# Patient Record
Sex: MALE | Race: WHITE | NOT HISPANIC OR LATINO | Employment: FULL TIME | URBAN - METROPOLITAN AREA
[De-identification: names, ages, dates, MRNs, and addresses within clinical notes are randomized per-mention and may not be internally consistent; named-entity substitution may affect disease eponyms.]

---

## 2018-08-17 ENCOUNTER — OFFICE VISIT (OUTPATIENT)
Dept: FAMILY MEDICINE | Facility: CLINIC | Age: 28
End: 2018-08-17
Payer: MEDICAID

## 2018-08-17 VITALS
SYSTOLIC BLOOD PRESSURE: 113 MMHG | BODY MASS INDEX: 23.3 KG/M2 | HEART RATE: 67 BPM | HEIGHT: 72 IN | WEIGHT: 172 LBS | OXYGEN SATURATION: 98 % | TEMPERATURE: 98 F | RESPIRATION RATE: 16 BRPM | DIASTOLIC BLOOD PRESSURE: 64 MMHG

## 2018-08-17 DIAGNOSIS — R06.83 SNORING: ICD-10-CM

## 2018-08-17 DIAGNOSIS — F33.1 MODERATE EPISODE OF RECURRENT MAJOR DEPRESSIVE DISORDER (H): Primary | ICD-10-CM

## 2018-08-17 DIAGNOSIS — F41.1 GAD (GENERALIZED ANXIETY DISORDER): ICD-10-CM

## 2018-08-17 PROCEDURE — 99203 OFFICE O/P NEW LOW 30 MIN: CPT | Performed by: FAMILY MEDICINE

## 2018-08-17 RX ORDER — CITALOPRAM HYDROBROMIDE 20 MG/1
20 TABLET ORAL DAILY
Qty: 90 TABLET | Refills: 3 | Status: SHIPPED | OUTPATIENT
Start: 2018-08-17 | End: 2019-07-12

## 2018-08-17 RX ORDER — BUPROPION HYDROCHLORIDE 300 MG/1
300 TABLET ORAL
COMMUNITY
Start: 2018-08-07 | End: 2018-08-17

## 2018-08-17 RX ORDER — BUPROPION HYDROCHLORIDE 300 MG/1
300 TABLET ORAL EVERY MORNING
Qty: 90 TABLET | Refills: 3 | Status: SHIPPED | OUTPATIENT
Start: 2018-08-17 | End: 2019-07-12

## 2018-08-17 RX ORDER — CITALOPRAM HYDROBROMIDE 20 MG/1
TABLET ORAL
COMMUNITY
Start: 2018-08-07 | End: 2018-08-17

## 2018-08-17 ASSESSMENT — ANXIETY QUESTIONNAIRES
5. BEING SO RESTLESS THAT IT IS HARD TO SIT STILL: NOT AT ALL
6. BECOMING EASILY ANNOYED OR IRRITABLE: NOT AT ALL
3. WORRYING TOO MUCH ABOUT DIFFERENT THINGS: NOT AT ALL
IF YOU CHECKED OFF ANY PROBLEMS ON THIS QUESTIONNAIRE, HOW DIFFICULT HAVE THESE PROBLEMS MADE IT FOR YOU TO DO YOUR WORK, TAKE CARE OF THINGS AT HOME, OR GET ALONG WITH OTHER PEOPLE: NOT DIFFICULT AT ALL
7. FEELING AFRAID AS IF SOMETHING AWFUL MIGHT HAPPEN: NOT AT ALL
GAD7 TOTAL SCORE: 1
2. NOT BEING ABLE TO STOP OR CONTROL WORRYING: NOT AT ALL
1. FEELING NERVOUS, ANXIOUS, OR ON EDGE: SEVERAL DAYS

## 2018-08-17 ASSESSMENT — PATIENT HEALTH QUESTIONNAIRE - PHQ9: 5. POOR APPETITE OR OVEREATING: NOT AT ALL

## 2018-08-17 NOTE — MR AVS SNAPSHOT
After Visit Summary   8/17/2018    José Waldron    MRN: [id number]           Patient Information     Date Of Birth          1990        Visit Information        Provider Department      8/17/2018 2:20 PM Levy Madrid MD [facility]        Today's Diagnoses     Moderate episode of recurrent major depressive disorder (H)    -  1    BEN (generalized anxiety disorder)        Snoring           Follow-ups after your visit        Additional Services     SLEEP EVALUATION & MANAGEMENT REFERRAL - [facility]  [phone] (Age 2 and up)       Please be aware that coverage of these services is subject to the terms and limitations of your health insurance plan.  Call member services at your health plan with any benefit or coverage questions.      Please bring the following to your appointment:    >>   List of current medications   >>   This referral request   >>   Any documents/labs given to you for this referral                      Future tests that were ordered for you today     Open Future Orders        Priority Expected Expires Ordered    SLEEP EVALUATION & MANAGEMENT REFERRAL - [facility]  [phone] (Age 2 and up) Routine  8/17/2019 8/17/2018            Who to contact     If you have questions or need follow up information about today's clinic visit or your schedule please contact [facility] directly at [phone].  Normal or non-critical lab and imaging results will be communicated to you by MyChart, letter or phone within 4 business days after the clinic has received the results. If you do not hear from us within 7 days, please contact the clinic through MyChart or phone. If you have a critical or abnormal lab result, we will notify you by phone as soon as possible.  Submit refill requests through [employer] or call your pharmacy and they  will forward the refill request to us. Please allow 3 business days for your refill to be completed.          Additional Information About Your Visit        Care EveryWhere ID     This is your Care EveryWhere ID. This could be used by other organizations to access your [facility] medical records  [id number]        Your Vitals Were     Pulse Temperature Respirations Height Pulse Oximetry BMI (Body Mass Index)    67 98  F (36.7  C) (Oral) 16 6' (1.829 m) 98% 23.33 kg/m2       Blood Pressure from Last 3 Encounters:   08/17/18 113/64    Weight from Last 3 Encounters:   08/17/18 172 lb (78 kg)                 Today's Medication Changes          These changes are accurate as of 8/17/18  2:56 PM.  If you have any questions, ask your nurse or doctor.               These medicines have changed or have updated prescriptions.        Dose/Directions    buPROPion 300 MG 24 hr tablet   Commonly known as:  WELLBUTRIN XL   This may have changed:  when to take this   Used for:  Moderate episode of recurrent major depressive disorder (H), BEN (generalized anxiety disorder)   Changed by:  Levy Madrid MD        Dose:  300 mg   Take 1 tablet (300 mg) by mouth every morning   Quantity:  90 tablet   Refills:  3       citalopram 20 MG tablet   Commonly known as:  celeXA   This may have changed:  See the new instructions.   Used for:  Moderate episode of recurrent major depressive disorder (H), BEN (generalized anxiety disorder)   Changed by:  Levy Madrid MD        Dose:  20 mg   Take 1 tablet (20 mg) by mouth daily   Quantity:  90 tablet   Refills:  3            Where to get your medicines      These medications were sent to [id number] - [place], [place] - [address] AT [facility] [address]  [address], [place] [address]    Hours:  24-hours Phone:  [phone]     buPROPion 300 MG 24 hr tablet    citalopram 20 MG tablet                Primary Care Provider Office Phone #  Fax #    Levy Juanita Madrid [phone] [address] [place] [id number]        Equal Access to Services     CAROLINA SINGLETARY : Hadii aad ku hadshannongreg Auguste, wamonida rogerqadaha, qakirata kaalmada bethivy, sharlene porshain hayaabecca camposmarkos arcadiovamshisony son. So [facility] [phone].    ATENCIÓN: Si habla español, tiene a purcell disposición servicios gratuitos de asistencia lingüística. Llame al [phone].    We comply with applicable federal civil rights laws and [place] laws. We do not discriminate on the basis of race, color, national origin, age, disability, sex, sexual orientation, or gender identity.            Thank you!     Thank you for choosing [facility]  for your care. Our goal is always to provide you with excellent care. Hearing back from our patients is one way we can continue to improve our services. Please take a few minutes to complete the written survey that you may receive in the mail after your visit with us. Thank you!             Your Updated Medication List - Protect others around you: Learn how to safely use, store and throw away your medicines at www.disposemymeds.org.          This list is accurate as of 8/17/18  2:56 PM.  Always use your most recent med list.                   Brand Name Dispense Instructions for use Diagnosis    buPROPion 300 MG 24 hr tablet    WELLBUTRIN XL    90 tablet    Take 1 tablet (300 mg) by mouth every morning    Moderate episode of recurrent major depressive disorder (H), BEN (generalized anxiety disorder)       citalopram 20 MG tablet    celeXA    90 tablet    Take 1 tablet (20 mg) by mouth daily    Moderate episode of recurrent major depressive disorder (H), BEN (generalized anxiety disorder)

## 2018-08-17 NOTE — PROGRESS NOTES
SUBJECTIVE:   José Waldron is a 27 year old male who presents to clinic today for the following health issues:      History of Present Illness     Depression & Anxiety Follow-up:     Depression/Anxiety:  Depression & Anxiety    Status since last visit::  Stable    Other associated symptoms of depression and anxiety::  YES    Significant life event::  No    Current substance use::  Alcohol and Prescription Drugs       Today's PHQ-9         PHQ-9 Total Score:         PHQ-9 Q9 Suicidal ideation:       Thoughts of suicide or self harm:      Self-harm Plan:        Self-harm Action:          Safety concerns for self or others:            Diet:  Regular (no restrictions)  Frequency of exercise:  2-3 days/week  Duration of exercise:  30-45 minutes  Taking medications regularly:  Yes  Medication side effects:  None  Additional concerns today:  [place]    - [employer] - physician retired.  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 Does well with rx.     When 21 diagnosed with anxiety.   When 23 - depression.     Father - depression.     No suicidal thoughts, attempts. No FH of suicide attempts.     No hospitalization.     Around 5 yrs of antidepressant and using celexa and wellbutrin combo.    No known family history of thyroid disease.     Interested in sleep study. [place] tooth removed - under sedation his breathing was not good.   Room partners -mentioned snoring.     Working part time.  - [employer].     Exercise - 3 times per week or so.     Lives with 2 roommates.    Smoking - no smoking.    Alcohol - 4-5 drinks per week.     No street drug use.     No manic episodes.     Problem list and histories reviewed & adjusted, as indicated.  Additional history: as documented        Social History     Social History     Marital status: Single     Spouse name: N/A     Number of children: N/A     Years of education: N/A     Occupational History     Not on file.     Social History Main Topics     Smoking status: Never Smoker      Smokeless tobacco: Never Used     Alcohol use Yes      Comment: 4 drinks per wlks     Drug use: Yes     Sexual activity: Yes     Partners: Male     Birth control/ protection: Condom     Other Topics Concern     Not on file     Social History Narrative     No narrative on file     No Known Allergies  There is no problem list on file for this patient.    Reviewed medications, social history and  past medical and surgical history.    Review of system: for general, respiratory, CVS, GI and psychiatry negative except for noted above.     EXAM:  /64  Pulse 67  Temp 98  F (36.7  C) (Oral)  Resp 16  Ht 6' (1.829 m)  Wt 172 lb (78 kg)  SpO2 98%  BMI 23.33 kg/m2  Constitutional: healthy, alert and no distress   Psychiatric: mentation appears normal and affect normal/bright  Cardiovascular: RRR. No murmurs,  Respiratory: negative, Lungs clear. No crackles or wheezing. No tachypnea.        ASSESSMENT / PLAN:  (F33.1) Moderate episode of recurrent major depressive disorder (H)  (primary encounter diagnosis)  Comment: He has a long history of depression and anxiety.  He has been on a stable dose of Wellbutrin and Celexa.  Continue the same.  Reviewed his records via care everywhere.  I will take over his medications.  He has never had  any ev depression and anxiety including vitamin D deficiency, low hemoglobin, TSH.  I offered him those labes.  He would like to hold off on those for now.  Plan: buPROPion (WELLBUTRIN XL) 300 MG 24 hr tablet,         citalopram (CELEXA) 20 MG tablet             (F41.1) BEN (generalized anxiety disorder)  Comment:     Plan: buPROPion (WELLBUTRIN XL) 300 MG 24 hr tablet,         citalopram (CELEXA) 20 MG tablet             (R06.83) Snoring  Comment:  Self-reported.  No previous history of sleep apnea.  Reasonable to consider sleep medication referral.   Plan: SLEEP EVALUATION & MANAGEMENT REFERRAL - [facility]   [phone] (Age 2 and up)             Follow up: In a year if doing well.       Answers for HPI/ROS submitted by the patient on 8/17/2018   PHQ-2 Score: 0

## 2018-08-18 ASSESSMENT — ANXIETY QUESTIONNAIRES: GAD7 TOTAL SCORE: 1

## 2018-08-18 ASSESSMENT — PATIENT HEALTH QUESTIONNAIRE - PHQ9: SUM OF ALL RESPONSES TO PHQ QUESTIONS 1-9: 2

## 2018-08-30 ENCOUNTER — OFFICE VISIT (OUTPATIENT)
Dept: SLEEP MEDICINE | Facility: CLINIC | Age: 28
End: 2018-08-30
Attending: FAMILY MEDICINE
Payer: MEDICAID

## 2018-08-30 VITALS
OXYGEN SATURATION: 97 % | BODY MASS INDEX: 23.3 KG/M2 | DIASTOLIC BLOOD PRESSURE: 61 MMHG | HEART RATE: 69 BPM | SYSTOLIC BLOOD PRESSURE: 115 MMHG | HEIGHT: 72 IN | WEIGHT: 172 LBS | RESPIRATION RATE: 16 BRPM

## 2018-08-30 DIAGNOSIS — G47.21 CIRCADIAN RHYTHM SLEEP DISORDER, DELAYED SLEEP PHASE TYPE: ICD-10-CM

## 2018-08-30 DIAGNOSIS — G47.8 UNHEALTHY SLEEP HABIT: ICD-10-CM

## 2018-08-30 DIAGNOSIS — J30.1 CHRONIC SEASONAL ALLERGIC RHINITIS DUE TO POLLEN: ICD-10-CM

## 2018-08-30 DIAGNOSIS — R06.83 SNORING: Primary | ICD-10-CM

## 2018-08-30 PROCEDURE — 99205 OFFICE O/P NEW HI 60 MIN: CPT | Performed by: NURSE PRACTITIONER

## 2018-08-30 NOTE — PATIENT INSTRUCTIONS
Use saline spray product (ocean complete or arm and hammer are easy to use) in shower where nose naturally opens up. Use another time of day with continue congestion over the sink.    Consider flonase about 4-6 wks before an allergy season, use as demonstrated at a time that is >20 mins away from rinsing nose    Avoid alcohol within 3-4 hrs of bedtime, and caffeine within 6 hrs of bedtime   Avoid spending a lot of time in bed not sleeping      Overnight ox, I'll update you through my chart    Insomnia - Delayed Sleep Phase  Each of us has a built in tendency to find it easier to stay up late at night or get up early in the morning.  Being a  night owl  or  early bird  is a function of our internal body clock.  When you are forced to keep a sleep schedule that goes against your typical habits (because a job or other responsibilities) insomnia can be the result.  Try the following changes to see if you can improve your sleep patterns:    Pick a 1) wake schedule and 2) bedtime  schedule with about 7-8 hours in bed that is consistent. This time will eventually become your bedtime with a consistent rise time.   That means keep the  rise time every day of the week including the weekends the same, for the next 4-6 weeks    Try to get outside for about 30 minutes after you get up in the morning if the sun is out.  Sunlight is the best way to  set  your internal body clock. A SAD lamp of 10,000lux for 30 minutes is also effective (use as I demonstrated-do not look directly into the lamp)  Block light by sunglasses (from 9:30-11P) and avoiding devices 1.5 hrs prior to your desired bedtime.     Take melatonin - 1 - 3 mg about 5 hours before desired bedtime  (7 PM)  Less important    Please keep a sleep log or diary during this time to track your sleep patterns    Vikas Maharaj, [employer]     If you return to clinic:  2 wk prior to seeing me for follow up of circ rhythm, please complete sleep diary.

## 2018-08-30 NOTE — PROGRESS NOTES
Visit Date:   08/30/2018      CHIEF COMPLAINT:  I have been asked to see Mr. Waldron, who prefers to be called [place], by    Dr. Madrid in consultation for non-restorative sleep.      HISTORY OF PRESENT ILLNESS:  The patient reports snoring and some nasal breathing restrictions, reports fatigue and not feeling rested.  This has gone on for multiple years.  He has difficulty with a.m. rise since his teens where it became more difficult at that point in time for him to awaken from sleep.  He admits to me that he is a night owl and this became more apparent as he entered college.  His current sleep schedule is as follows:  Entering bed somewhere between 10-11:30 with the lights out expected at 11:30, on occasion may enter bed at midnight or slightly later.  On weekends, entering bed somewhere between midnight and 1:30A.  Exits bed between 8 a.m. on workdays, most often between 8:45-9:15 in the morning.  Quite frequently the alarm will go off at 8:45 and he will hit the snooze repeatedly until 9:15. On weekends, exits bed somewhere between 10 a.m. and 11 a.m.  Sleep latency is about 20 minutes.  Rarely wakes up throughout the night that he can remember, on occasion maybe once.  Total sleep time is 7-1/2 to 8 hours, feels best if he gets 8 or more.  On weekends, does get 8-9.  Once a week will nap for about an hour at about 2 p.m.        Activities in bed do include reading, often from a book, but then will change to social media prior to bedtime.  His ideal sleep schedule would be somewhere between midnight and 9:30 in the morning.  He has been reported to snore.  It certainly is worse when he has an upper respiratory or struggles with seasonal allergies which occur during spring and fall.  Likely can be worse at times on his back, but primarily sleeps on his sides.  Sleepiness does affect his performance at work.  He does not feel fresh.      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  Sleep environment is conducive to good sleep.  Family history is positive for snoring. Alcohol intake: may have 1 or 2 drinks of alcohol somewhere between 6 p.m. and 10 p.m. on a work night, or between 10 p.m. and midnight on a weekend.  No use of alternative recreational drugs, no use of sleep aids.  Does drink caffeine, on occasion may have it within 6 hours of bedtime.  This occurs about 2/7 nights.  Does exercise.      [place] Sleepiness Scale today is 10/24.  Denies problems with driving, sleepiness is affecting performance at work, feeling not as productive or creative, 24/30 days he is tired and fatigued, 10/30 days anxiety is more of a difficulty for him than depression.  He is on medication for depression and anxiety which is working well, but feels that if he slept better his anxiety might be less.      REVIEW OF SYSTEMS:  A 14-point comprehensive review of systems is completed and negative with exception of mental health concerns of depression and anxiety and ear, nose and throat, occasional dry mouth.      Allergies:    No Known Allergies    Medications:    Current Outpatient Prescriptions   Medication Sig Dispense Refill     buPROPion (WELLBUTRIN XL) 300 MG 24 hr tablet Take 1 tablet (300 mg) by mouth every morning 90 tablet 3     citalopram (CELEXA) 20 MG tablet Take 1 tablet (20 mg) by mouth daily 90 tablet 3       Problem List:  There are no active problems to display for this patient.       Past Medical/Surgical History:  Past Medical History:   Diagnosis Date     Anxiety      Depression      Past Surgical History:   Procedure Laterality Date     MOUTH SURGERY      [place] teeth removal           Physical Examination:  Vitals: /61  Pulse 69  Resp 16  Ht 1.829 m (6')  Wt 78 kg (172 lb)  SpO2 97%  BMI 23.33 kg/m2  BMI= Body mass index is  23.33 kg/(m^2).    Neck Cir (cm): 38 cm    [place] Total Score 8/30/2018   Total score - [place] 10     GENERAL APPEARANCE: healthy, alert and no distress    ASSESSMENT AND PLAN:  It is my impression that Mr. Waldron does have symptoms of snoring with little to no sleep fragmentation, a STOP-Bang score somewhere between 1-2 with reports of feeling tired, fatigued or sleepy during the daytime, most likely related to a delayed sleep phase disorder.  He is concerned, however, and agrees to screening for a possibility of sleep apnea and the following plan of care has been developed:   1.  Snoring:  I have ordered an overnight oximetry to occur in a routine fashion on room air.  I have encouraged him to sign up for M-Filest and I will respond to him via [employer] as to these results.   2.  Delayed sleep phase disorder:  He admits that his circadian rhythm is delayed, that if he was on vacation, certainly going to bed late and sleeping in until mid-morning or later is his preference.  By his report, he sounds to be a longer sleeper and understands the concept of social jet leg and that is most likely what he is currently experiencing in his difficulty with a rise time for his start at work of 9:30 and also an issue of his mind becoming more active the later he is awake at night.  I have laid out a plan with him to phase advance his sleep schedule, protecting his eyes from light prior to bedtime, providing either bright sunlight or 10,000 Lux first thing in the morning on a regular basis.  He has been given a reference of  Dr. Maharaj's work in [place], [place] as to how he phase advanced sleep schedules and he will come back to see me if he has problems.   3.  Rhinitis:  I have recommended that he consider saline in the shower to open his nose and return to Flonase which he has used in the past, but never with the right technique.  I have demonstrated how to use Flonase and he will return to that prior to his allergy  season.   4.  Sleep habits:  We discussed the impact of late caffeine, alcohol on sleep fragmentation and certainly to avoid spending a lot of time in bed prior to bedtime.  He is encouraged to wind down outside of bed.      Thank you for allowing me to participate in this kind man's care.      Time spent with patient 60 minutes of which greater than 50% was spent in counseling, education and coordination of care.         KIM CARRILLO, CNP             D: 2018   T: 2018   MT: TONI      Name:     REGINALD BARNES   MRN:      [id number]        Account:      [id number]   :      1990           Visit Date:   2018      Document: [id number]

## 2018-08-30 NOTE — MR AVS SNAPSHOT
After Visit Summary   8/30/2018    José Waldron    MRN: [id number]           Patient Information     Date Of Birth          1990        Visit Information        Provider Department      8/30/2018 1:00 PM Maricarmen Brunson APRN CNP [facility]        Today's Diagnoses     Snoring          Care Instructions      Use saline spray product (ocean complete or arm and hammer are easy to use) in shower where nose naturally opens up. Use another time of day with continue congestion over the sink.    Consider flonase about 4-6 wks before an allergy season, use as demonstrated at a time that is >20 mins away from rinsing nose    Avoid alcohol within 3-4 hrs of bedtime, and caffeine within 6 hrs of bedtime   Avoid spending a lot of time in bed not sleeping      Overnight ox, I'll update you through my chart    Insomnia - Delayed Sleep Phase  Each of us has a built in tendency to find it easier to stay up late at night or get up early in the morning.  Being a  night owl  or  early bird  is a function of our internal body clock.  When you are forced to keep a sleep schedule that goes against your typical habits (because a job or other responsibilities) insomnia can be the result.  Try the following changes to see if you can improve your sleep patterns:    Pick a 1) wake schedule and 2) bedtime  schedule with about 7-8 hours in bed that is consistent. This time will eventually become your bedtime with a consistent rise time.   That means keep the  rise time every day of the week including the weekends the same, for the next 4-6 weeks    Try to get outside for about 30 minutes after you get up in the morning if the sun is out.  Sunlight is the best way to  set  your internal body clock. A SAD lamp of 10,000lux for 30 minutes is also effective (use as I demonstrated-do not look directly into the lamp)  Block light by sunglasses (from 9:30-11P) and avoiding devices 1.5 hrs prior  to your desired bedtime.     Take melatonin - 1 - 3 mg about 5 hours before desired bedtime  (7 PM)  Less important    Please keep a sleep log or diary during this time to track your sleep patterns    martin Ann.     2 wk prior to seeing me for follow up of circ rhythm, please complete sleep diary.            Follow-ups after your visit        Follow-up notes from your care team     Return if symptoms worsen or fail to improve, for my chart plz, samm without followup (my chart).      Your next 10 appointments already scheduled     Sep 04, 2018  1:00 PM CDT   Oximetry  with SLEEP STUDY  7   [facility] ([facility])    [address] [place] [id number] [phone]            Sep 05, 2018  1:00 PM CDT   Oximetry Drop Off with SLEEP STUDY  7   [facility] ([facility])    [address] [place] [id number] [phone]              Future tests that were ordered for you today     Open Future Orders        Priority Expected Expires Ordered    Overnight oximetry study Routine  2/26/2019 8/30/2018            Who to contact     If you have questions or need follow up information about today's clinic visit or your schedule please contact [facility] directly at [phone].  Normal or non-critical lab and imaging results will be communicated to you by MyChart, letter or phone within 4 business days after the clinic has received the results. If you do not hear from us within 7 days, please contact the clinic through MyChart or phone. If you have a critical or abnormal lab result, we will notify you by phone as soon as possible.  Submit refill requests through [employer] or call your pharmacy and they will forward the refill request to us. Please allow 3 business days for your refill to be completed.          Additional  Information About Your Visit        Care EveryWhere ID     This is your Care EveryWhere ID. This could be used by other organizations to access your [place] medical records  [id number]        Your Vitals Were     Pulse Respirations Height Pulse Oximetry BMI (Body Mass Index)       69 16 1.829 m (6') 97% 23.33 kg/m2        Blood Pressure from Last 3 Encounters:   08/30/18 115/61   08/17/18 113/64    Weight from Last 3 Encounters:   08/30/18 78 kg (172 lb)   08/17/18 78 kg (172 lb)              We Performed the Following     SLEEP EVALUATION & MANAGEMENT REFERRAL - ADULT -[facility]  [phone] (Age 2 and up)        Primary Care Provider Office Phone # Fax #    Levy Juanita Madrid [phone] [address] [place] [id number]        Equal Access to Services     CAROLINA SINGLETARY : Hadii sally smitho Sobebeto, waaxda luqadaha, qaybta kaalmada adeegyada, sharlene siegel haycrystal pacheco . So [facility] [place] [phone].    ATENCIÓN: Si habla español, tiene a purcell disposición servicios gratuitos de asistencia lingüística. Bandar al [phone].    We comply with applicable federal civil rights laws and [place] laws. We do not discriminate on the basis of race, color, national origin, age, disability, sex, sexual orientation, or gender identity.            Thank you!     Thank you for choosing [facility]  for your care. Our goal is always to provide you with excellent care. Hearing back from our patients is one way we can continue to improve our services. Please take a few minutes to complete the written survey that you may receive in the mail after your visit with us. Thank you!             Your Updated Medication List - Protect others around you: Learn how to safely use, store and throw away your medicines at www.disposemymeds.org.          This list is accurate as of 8/30/18  2:10 PM.  Always use your most recent  med list.                   Brand Name Dispense Instructions for use Diagnosis    buPROPion 300 MG 24 hr tablet    WELLBUTRIN XL    90 tablet    Take 1 tablet (300 mg) by mouth every morning    Moderate episode of recurrent major depressive disorder (H), BEN (generalized anxiety disorder)       citalopram 20 MG tablet    celeXA    90 tablet    Take 1 tablet (20 mg) by mouth daily    Moderate episode of recurrent major depressive disorder (H), BEN (generalized anxiety disorder)

## 2018-09-04 ENCOUNTER — OFFICE VISIT (OUTPATIENT)
Dept: SLEEP MEDICINE | Facility: CLINIC | Age: 28
End: 2018-09-04
Payer: COMMERCIAL

## 2018-09-04 DIAGNOSIS — R06.83 SNORING: ICD-10-CM

## 2018-09-04 NOTE — MR AVS SNAPSHOT
After Visit Summary   9/4/2018    José Waldron    MRN: [id number]           Patient Information     Date Of Birth          1990        Visit Information        Provider Department      9/4/2018 1:00 PM SLEEP STUDY RM 7 [facility]        Today's Diagnoses     Snoring           Follow-ups after your visit        Who to contact     If you have questions or need follow up information about today's clinic visit or your schedule please contact [facility] directly at [phone].  Normal or non-critical lab and imaging results will be communicated to you by MyChart, letter or phone within 4 business days after the clinic has received the results. If you do not hear from us within 7 days, please contact the clinic through [employer] or phone. If you have a critical or abnormal lab result, we will notify you by phone as soon as possible.  Submit refill requests through [employer] or call your pharmacy and they will forward the refill request to us. Please allow 3 business days for your refill to be completed.          Additional Information About Your Visit        MyChart Information     [employer] gives you secure access to your electronic health record. If you see a primary care provider, you can also send messages to your care team and make appointments. If you have questions, please call your primary care clinic.  If you do not have a primary care provider, please call [phone] and they will assist you.        Care EveryWhere ID     This is your Care EveryWhere ID. This could be used by other organizations to access your [facility] medical records  [id number]         Blood Pressure from Last 3 Encounters:   08/30/18 115/61   08/17/18 113/64    Weight from Last 3 Encounters:   08/30/18 78 kg (172 lb)   08/17/18 78 kg (172 lb)              We Performed the Following     Overnight oximetry study        Primary Care Provider Office Phone # Fax #    [facility]  Juanita Madrid [phone]       [address] [place] [address]        Equal Access to Services     CAROLINA SINGLETARY : Varsha Auguste, rosio saini, desiree gamingmaana chester, waxkaitlynn porshain hayaabecca ervinvamshisony son. So [facility] [phone].    ATENCIÓN: Si habla español, tiene a purcell disposición servicios gratuitos de asistencia lingüística. Llame al [phone].    We comply with applicable federal civil rights laws and [place] laws. We do not discriminate on the basis of race, color, national origin, age, disability, sex, sexual orientation, or gender identity.            Thank you!     Thank you for choosing [facility]  for your care. Our goal is always to provide you with excellent care. Hearing back from our patients is one way we can continue to improve our services. Please take a few minutes to complete the written survey that you may receive in the mail after your visit with us. Thank you!             Your Updated Medication List - Protect others around you: Learn how to safely use, store and throw away your medicines at www.disposemymeds.org.          This list is accurate as of 9/4/18 11:59 PM.  Always use your most recent med list.                   Brand Name Dispense Instructions for use Diagnosis    buPROPion 300 MG 24 hr tablet    WELLBUTRIN XL    90 tablet    Take 1 tablet (300 mg) by mouth every morning    Moderate episode of recurrent major depressive disorder (H), BEN (generalized anxiety disorder)       citalopram 20 MG tablet    celeXA    90 tablet    Take 1 tablet (20 mg) by mouth daily    Moderate episode of recurrent major depressive disorder (H), BEN (generalized anxiety disorder)

## 2018-09-05 ENCOUNTER — DOCUMENTATION ONLY (OUTPATIENT)
Dept: SLEEP MEDICINE | Facility: CLINIC | Age: 28
End: 2018-09-05
Payer: COMMERCIAL

## 2019-07-12 ENCOUNTER — OFFICE VISIT (OUTPATIENT)
Dept: FAMILY MEDICINE | Facility: CLINIC | Age: 29
End: 2019-07-12
Payer: COMMERCIAL

## 2019-07-12 VITALS
RESPIRATION RATE: 20 BRPM | HEIGHT: 72 IN | OXYGEN SATURATION: 97 % | BODY MASS INDEX: 23.87 KG/M2 | HEART RATE: 85 BPM | TEMPERATURE: 98.5 F | DIASTOLIC BLOOD PRESSURE: 70 MMHG | WEIGHT: 176.25 LBS | SYSTOLIC BLOOD PRESSURE: 126 MMHG

## 2019-07-12 DIAGNOSIS — F41.1 GAD (GENERALIZED ANXIETY DISORDER): ICD-10-CM

## 2019-07-12 DIAGNOSIS — Z00.00 ROUTINE GENERAL MEDICAL EXAMINATION AT A HEALTH CARE FACILITY: Primary | ICD-10-CM

## 2019-07-12 DIAGNOSIS — Z11.3 SCREENING FOR STDS (SEXUALLY TRANSMITTED DISEASES): ICD-10-CM

## 2019-07-12 DIAGNOSIS — Z13.6 SCREENING FOR CARDIOVASCULAR CONDITION: ICD-10-CM

## 2019-07-12 DIAGNOSIS — F33.1 MODERATE EPISODE OF RECURRENT MAJOR DEPRESSIVE DISORDER (H): ICD-10-CM

## 2019-07-12 DIAGNOSIS — Z13.1 SCREENING FOR DIABETES MELLITUS: ICD-10-CM

## 2019-07-12 LAB
CHOLEST SERPL-MCNC: 234 MG/DL
ERYTHROCYTE [DISTWIDTH] IN BLOOD BY AUTOMATED COUNT: 12.4 % (ref 10–15)
GLUCOSE SERPL-MCNC: 88 MG/DL (ref 70–99)
HCT VFR BLD AUTO: 44.7 % (ref 40–53)
HDLC SERPL-MCNC: 52 MG/DL
HGB BLD-MCNC: 15.1 G/DL (ref 13.3–17.7)
LDLC SERPL CALC-MCNC: 109 MG/DL
MCH RBC QN AUTO: 29.7 PG (ref 26.5–33)
MCHC RBC AUTO-ENTMCNC: 33.8 G/DL (ref 31.5–36.5)
MCV RBC AUTO: 88 FL (ref 78–100)
NONHDLC SERPL-MCNC: 182 MG/DL
PLATELET # BLD AUTO: 221 10E9/L (ref 150–450)
RBC # BLD AUTO: 5.08 10E12/L (ref 4.4–5.9)
TRIGL SERPL-MCNC: 365 MG/DL
TSH SERPL DL<=0.005 MIU/L-ACNC: 0.66 MU/L (ref 0.4–4)
WBC # BLD AUTO: 7.3 10E9/L (ref 4–11)

## 2019-07-12 PROCEDURE — 36415 COLL VENOUS BLD VENIPUNCTURE: CPT | Performed by: FAMILY MEDICINE

## 2019-07-12 PROCEDURE — 99395 PREV VISIT EST AGE 18-39: CPT | Performed by: FAMILY MEDICINE

## 2019-07-12 PROCEDURE — 80061 LIPID PANEL: CPT | Performed by: FAMILY MEDICINE

## 2019-07-12 PROCEDURE — 82306 VITAMIN D 25 HYDROXY: CPT | Performed by: FAMILY MEDICINE

## 2019-07-12 PROCEDURE — 84443 ASSAY THYROID STIM HORMONE: CPT | Performed by: FAMILY MEDICINE

## 2019-07-12 PROCEDURE — 85027 COMPLETE CBC AUTOMATED: CPT | Performed by: FAMILY MEDICINE

## 2019-07-12 PROCEDURE — 82947 ASSAY GLUCOSE BLOOD QUANT: CPT | Performed by: FAMILY MEDICINE

## 2019-07-12 PROCEDURE — 87389 HIV-1 AG W/HIV-1&-2 AB AG IA: CPT | Performed by: FAMILY MEDICINE

## 2019-07-12 RX ORDER — BUPROPION HYDROCHLORIDE 300 MG/1
300 TABLET ORAL EVERY MORNING
Qty: 90 TABLET | Refills: 3 | Status: SHIPPED | OUTPATIENT
Start: 2019-08-12 | End: 2020-07-14

## 2019-07-12 RX ORDER — CITALOPRAM HYDROBROMIDE 20 MG/1
20 TABLET ORAL DAILY
Qty: 90 TABLET | Refills: 3 | Status: SHIPPED | OUTPATIENT
Start: 2019-08-12 | End: 2020-07-14

## 2019-07-12 ASSESSMENT — ANXIETY QUESTIONNAIRES
GAD7 TOTAL SCORE: 5
GAD7 TOTAL SCORE: 5
5. BEING SO RESTLESS THAT IT IS HARD TO SIT STILL: NOT AT ALL
4. TROUBLE RELAXING: SEVERAL DAYS
7. FEELING AFRAID AS IF SOMETHING AWFUL MIGHT HAPPEN: NOT AT ALL
2. NOT BEING ABLE TO STOP OR CONTROL WORRYING: MORE THAN HALF THE DAYS
GAD7 TOTAL SCORE: 5
1. FEELING NERVOUS, ANXIOUS, OR ON EDGE: SEVERAL DAYS
7. FEELING AFRAID AS IF SOMETHING AWFUL MIGHT HAPPEN: NOT AT ALL
6. BECOMING EASILY ANNOYED OR IRRITABLE: NOT AT ALL
3. WORRYING TOO MUCH ABOUT DIFFERENT THINGS: SEVERAL DAYS

## 2019-07-12 ASSESSMENT — ENCOUNTER SYMPTOMS
DIZZINESS: 0
HEMATOCHEZIA: 0
HEADACHES: 0
PARESTHESIAS: 0
CHILLS: 0
FEVER: 0
DIARRHEA: 0
HEARTBURN: 0
SHORTNESS OF BREATH: 0
SORE THROAT: 0
NAUSEA: 0
COUGH: 0
NERVOUS/ANXIOUS: 0
HEMATURIA: 0
EYE PAIN: 0
DYSURIA: 0
WEAKNESS: 0
JOINT SWELLING: 0
FREQUENCY: 0
ARTHRALGIAS: 0
ABDOMINAL PAIN: 0
CONSTIPATION: 0
MYALGIAS: 0
PALPITATIONS: 0

## 2019-07-12 ASSESSMENT — MIFFLIN-ST. JEOR: SCORE: 1817.46

## 2019-07-12 ASSESSMENT — PATIENT HEALTH QUESTIONNAIRE - PHQ9
SUM OF ALL RESPONSES TO PHQ QUESTIONS 1-9: 2
SUM OF ALL RESPONSES TO PHQ QUESTIONS 1-9: 2
10. IF YOU CHECKED OFF ANY PROBLEMS, HOW DIFFICULT HAVE THESE PROBLEMS MADE IT FOR YOU TO DO YOUR WORK, TAKE CARE OF THINGS AT HOME, OR GET ALONG WITH OTHER PEOPLE: SOMEWHAT DIFFICULT

## 2019-07-12 NOTE — PROGRESS NOTES
SUBJECTIVE:   CC: José Waldron is an 28 year old male who presents for preventative health visit.     Healthy Habits:     Getting at least 3 servings of Calcium per day:  NO    Bi-annual eye exam:  Yes    Dental care twice a year:  Yes    Sleep apnea or symptoms of sleep apnea:  None    Diet:  Regular (no restrictions)    Frequency of exercise:  2-3 days/week    Duration of exercise:  30-45 minutes    Taking medications regularly:  No    Barriers to taking medications:  None    Medication side effects:  None    PHQ-2 Total Score: 0    Additional concerns today:  Yes    Today's PHQ-2 Score:   PHQ-2 ( 1999 [employer]) 7/12/2019   Q1: Little interest or pleasure in doing things 0   Q2: Feeling down, depressed or hopeless 0   PHQ-2 Score 0   Q1: Little interest or pleasure in doing things Not at all   Q2: Feeling down, depressed or hopeless Not at all   PHQ-2 Score 0     Abuse: Current or Past(Physical, Sexual or Emotional)- No  Do you feel safe in your environment? Yes    Social History     Tobacco Use     Smoking status: Never Smoker     Smokeless tobacco: Never Used   Substance Use Topics     Alcohol use: Yes     Comment: 4 drinks per wlks     Alcohol Use 7/12/2019   Prescreen: >3 drinks/day or >7 drinks/week? No     Last PSA: No results found for: PSA    Reviewed orders with patient. Reviewed health maintenance and updated orders accordingly - Yes     Reviewed and updated as needed this visit by clinical staff    Reviewed and updated as needed this visit by Provider    No suicidal thoughts. meds are stable. They are doing its job well as per patient. . Sweaty sometime.     Work - still working at [employer]. Some physical job.     Lives with 2 roommates.     Review of Systems   Constitutional: Negative for chills and fever.   HENT: Negative for congestion, ear pain, hearing loss and sore throat.    Eyes: Negative for pain and visual disturbance.   Respiratory: Negative for cough and shortness of breath.     Cardiovascular: Negative for chest pain, palpitations and peripheral edema.   Gastrointestinal: Negative for abdominal pain, constipation, diarrhea, heartburn, hematochezia and nausea.   Genitourinary: Negative for discharge, dysuria, frequency, genital sores, hematuria, impotence and urgency.   Musculoskeletal: Negative for arthralgias, joint swelling and myalgias.   Skin: Negative for rash.   Neurological: Negative for dizziness, weakness, headaches and paresthesias.   Psychiatric/Behavioral: Negative for mood changes. The patient is not nervous/anxious.           OBJECTIVE:   /70 (BP Location: Right arm, Patient Position: Chair, Cuff Size: Adult Regular)   Pulse 85   Temp 98.5  F (36.9  C) (Oral)   Resp 20   Ht 1.829 m (6')   Wt 79.9 kg (176 lb 4 oz)   SpO2 97%   BMI 23.90 kg/m      Physical Exam  GENERAL: healthy, alert and no distress  EYES: Eyes grossly normal to inspection, PERRL and conjunctivae and sclerae normal  HENT: ear canals and TM's normal, nose and mouth without ulcers or lesions  NECK: no adenopathy, no asymmetry, masses, or scars and thyroid normal to palpation  RESP: lungs clear to auscultation - no rales, rhonchi or wheezes  CV: regular rate and rhythm, normal S1 S2, no S3 or S4, no murmur, click or rub, no peripheral edema and peripheral pulses strong  ABDOMEN: soft, nontender, no hepatosplenomegaly, no masses and bowel sounds normal   (male): normal male genitalia without lesions or urethral discharge, no hernia  MS: no gross musculoskeletal defects noted, no edema  SKIN: right deltoid area - few erythematous patches from skin picking. Not infected.   NEURO: Normal strength and tone, mentation intact and speech normal  PSYCH: mentation appears normal, affect normal/bright    ASSESSMENT/PLAN:   1. Routine general medical examination at a health care facility       2. Moderate episode of recurrent major depressive disorder (H)  Patient is tolerating current medication without  any major side effects of concerns and current dose seems reasonable too.  Current medication regime is effective. Continue current treatment without any changes.   - buPROPion (WELLBUTRIN XL) 300 MG 24 hr tablet; Take 1 tablet (300 mg) by mouth every morning  Dispense: 90 tablet; Refill: 3  - citalopram (CELEXA) 20 MG tablet; Take 1 tablet (20 mg) by mouth daily  Dispense: 90 tablet; Refill: 3  - CBC with platelets  - Vitamin D Deficiency  - TSH with free T4 reflex    3. BEN (generalized anxiety disorder)   Patient is tolerating current medication without any major side effects of concerns and current dose seems reasonable too.  Current medication regime is effective. Continue current treatment without any changes.   - buPROPion (WELLBUTRIN XL) 300 MG 24 hr tablet; Take 1 tablet (300 mg) by mouth every morning  Dispense: 90 tablet; Refill: 3  - citalopram (CELEXA) 20 MG tablet; Take 1 tablet (20 mg) by mouth daily  Dispense: 90 tablet; Refill: 3  - CBC with platelets  - Vitamin D Deficiency  - TSH with free T4 reflex    4. Screening for STDs (sexually transmitted diseases)     - HIV Screening    5. Screening for diabetes mellitus     - Glucose    6. Screening for cardiovascular condition     - Lipid panel reflex to direct LDL Non-fasting    COUNSELING:   Reviewed preventive health counseling, as reflected in patient instructions  Special attention given to:        Regular exercise       Healthy diet/nutrition       Vision screening       Hearing screening    Estimated body mass index is 23.33 kg/m  as calculated from the following:    Height as of 8/30/18: 1.829 m (6').    Weight as of 8/30/18: 78 kg (172 lb).          reports that he has never smoked. He has never used smokeless tobacco.      Counseling Resources:  ATP IV Guidelines  Pooled Cohorts Equation Calculator  FRAX Risk Assessment  ICSI Preventive Guidelines  Dietary Guidelines for Americans, 2010  USDA's MyPlate  ASA Prophylaxis  Lung CA  Screening    Levy Madrid MD, MD  [facility]

## 2019-07-13 ASSESSMENT — ANXIETY QUESTIONNAIRES: GAD7 TOTAL SCORE: 5

## 2019-07-13 ASSESSMENT — PATIENT HEALTH QUESTIONNAIRE - PHQ9: SUM OF ALL RESPONSES TO PHQ QUESTIONS 1-9: 2

## 2019-07-15 LAB
DEPRECATED CALCIDIOL+CALCIFEROL SERPL-MC: 28 UG/L (ref 20–75)
HIV 1+2 AB+HIV1 P24 AG SERPL QL IA: NONREACTIVE

## 2019-11-19 ENCOUNTER — OFFICE VISIT (OUTPATIENT)
Dept: FAMILY MEDICINE | Facility: CLINIC | Age: 29
End: 2019-11-19
Payer: COMMERCIAL

## 2019-11-19 VITALS
HEIGHT: 72 IN | SYSTOLIC BLOOD PRESSURE: 96 MMHG | OXYGEN SATURATION: 95 % | RESPIRATION RATE: 16 BRPM | TEMPERATURE: 96.8 F | BODY MASS INDEX: 24.04 KG/M2 | WEIGHT: 177.5 LBS | HEART RATE: 80 BPM | DIASTOLIC BLOOD PRESSURE: 78 MMHG

## 2019-11-19 DIAGNOSIS — L98.9 SKIN LESION: Primary | ICD-10-CM

## 2019-11-19 DIAGNOSIS — Z23 NEED FOR PROPHYLACTIC VACCINATION AND INOCULATION AGAINST INFLUENZA: ICD-10-CM

## 2019-11-19 PROCEDURE — 99213 OFFICE O/P EST LOW 20 MIN: CPT | Mod: 25 | Performed by: FAMILY MEDICINE

## 2019-11-19 PROCEDURE — 90686 IIV4 VACC NO PRSV 0.5 ML IM: CPT | Performed by: FAMILY MEDICINE

## 2019-11-19 PROCEDURE — 90471 IMMUNIZATION ADMIN: CPT | Performed by: FAMILY MEDICINE

## 2019-11-19 ASSESSMENT — ANXIETY QUESTIONNAIRES
IF YOU CHECKED OFF ANY PROBLEMS ON THIS QUESTIONNAIRE, HOW DIFFICULT HAVE THESE PROBLEMS MADE IT FOR YOU TO DO YOUR WORK, TAKE CARE OF THINGS AT HOME, OR GET ALONG WITH OTHER PEOPLE: NOT DIFFICULT AT ALL
GAD7 TOTAL SCORE: 2
6. BECOMING EASILY ANNOYED OR IRRITABLE: NOT AT ALL
5. BEING SO RESTLESS THAT IT IS HARD TO SIT STILL: NOT AT ALL
7. FEELING AFRAID AS IF SOMETHING AWFUL MIGHT HAPPEN: NOT AT ALL
3. WORRYING TOO MUCH ABOUT DIFFERENT THINGS: SEVERAL DAYS
1. FEELING NERVOUS, ANXIOUS, OR ON EDGE: SEVERAL DAYS
2. NOT BEING ABLE TO STOP OR CONTROL WORRYING: NOT AT ALL

## 2019-11-19 ASSESSMENT — PATIENT HEALTH QUESTIONNAIRE - PHQ9
SUM OF ALL RESPONSES TO PHQ QUESTIONS 1-9: 2
5. POOR APPETITE OR OVEREATING: NOT AT ALL

## 2019-11-19 ASSESSMENT — MIFFLIN-ST. JEOR: SCORE: 1818.13

## 2019-11-19 NOTE — LETTER
My Depression Action Plan  Name: José Waldron   Date of Birth 1990  Date: 11/19/2019    My doctor: Levy Madrid   My clinic: [address] [address] [phone]          GREEN    ZONE   Good Control    What it looks like:     Things are going generally well. You have normal up s and down s. You may even feel depressed from time to time, but bad moods usually last less than a day.   What you need to do:  1. Continue to care for yourself (see self care plan)  2. Check your depression survival kit and update it as needed  3. Follow your physician s recommendations including any medication.  4. Do not stop taking medication unless you consult with your physician first.           YELLOW         ZONE Getting Worse    What it looks like:     Depression is starting to interfere with your life.     It may be hard to get out of bed; you may be starting to isolate yourself from others.    Symptoms of depression are starting to last most all day and this has happened for several days.     You may have suicidal thoughts but they are not constant.   What you need to do:     1. Call your care team, your response to treatment will improve if you keep your care team informed of your progress. Yellow periods are signs an adjustment may need to be made.     2. Continue your self-care, even if you have to fake it!    3. Talk to someone in your support network    4. Open up your depression survival kit           RED    ZONE Medical Alert - Get Help    What it looks like:     Depression is seriously interfering with your life.     You may experience these or other symptoms: You can t get out of bed most days, can t work or engage in other necessary activities, you have trouble taking care of basic hygiene, or basic responsibilities, thoughts of suicide or death that will not go away, self-injurious behavior.     What you need to do:  1. Call your  care team and request a same-day appointment. If they are not available (weekends or after hours) call your local crisis line, emergency room or 911.            Depression Self Care Plan / Survival Kit    Self-Care for Depression  Here s the deal. Your body and mind are really not as separate as most people think.  What you do and think affects how you feel and how you feel influences what you do and think. This means if you do things that people who feel good do, it will help you feel better.  Sometimes this is all it takes.  There is also a place for medication and therapy depending on how severe your depression is, so be sure to consult with your medical provider and/ or Behavioral Health Consultant if your symptoms are worsening or not improving.     In order to better manage my stress, I will:    Exercise  Get some form of exercise, every day. This will help reduce pain and release endorphins, the  feel good  chemicals in your brain. This is almost as good as taking antidepressants!  This is not the same as joining a gym and then never going! (they count on that by the way ) It can be as simple as just going for a walk or doing some gardening, anything that will get you moving.      Hygiene   Maintain good hygiene (Get out of bed in the morning, Make your bed, Brush your teeth, Take a shower, and Get dressed like you were going to work, even if you are unemployed).  If your clothes don't fit try to get ones that do.    Diet  I will strive to eat foods that are good for me, drink plenty of water, and avoid excessive sugar, caffeine, alcohol, and other mood-altering substances.  Some foods that are helpful in depression are: complex carbohydrates, B vitamins, flaxseed, fish or fish oil, fresh fruits and vegetables.    Psychotherapy  I agree to participate in Individual Therapy (if recommended).    Medication  If prescribed medications, I agree to take them.  Missing doses can result in serious side effects.  I  understand that drinking alcohol, or other illicit drug use, may cause potential side effects.  I will not stop my medication abruptly without first discussing it with my provider.    Staying Connected With Others  I will stay in touch with my friends, family members, and my primary care provider/team.    Use your imagination  Be creative.  We all have a creative side; it doesn t matter if it s oil painting, sand castles, or mud pies! This will also kick up the endorphins.    Witness Beauty  (AKA stop and smell the roses) Take a look outside, even in mid-winter. Notice colors, textures. Watch the squirrels and birds.     Service to others  Be of service to others.  There is always someone else in need.  By helping others we can  get out of ourselves  and remember the really important things.  This also provides opportunities for practicing all the other parts of the program.    Humor  Laugh and be silly!  Adjust your TV habits for less news and crime-drama and more comedy.    Control your stress  Try breathing deep, massage therapy, biofeedback, and meditation. Find time to relax each day.     My support system    Clinic Contact:  Phone number:    Contact 1:  Phone number:    Contact 2:  Phone number:    Muslim/:  Phone number:    Therapist:  Phone number:    Local crisis center:    Phone number:    Other community support:  Phone number:

## 2019-11-19 NOTE — PROGRESS NOTES
Subjective     José Waldron is a 29 year old male who presents to clinic today for the following health issues:    HPI   Bump under Chin      Duration: 3 weeks     Description (location/character/radiation): 1 bump under chin on right side     Intensity:  no    Accompanying signs and symptoms: grown a little, when he puts pressure, gets soft and loose, no drainage, and redness    History (similar episodes/previous evaluation): None    Precipitating or alleviating factors: None    Therapies tried and outcome: None      no specific trigger.   No pain.   Celia use.   Did not try any medication.   Sometime looks better but does not go away.          Social History     Occupational History     Not on file   Tobacco Use     Smoking status: Never Smoker     Smokeless tobacco: Never Used   Substance and Sexual Activity     Alcohol use: Yes     Comment: 4 drinks per wlks     Drug use: Never     Sexual activity: Yes     Partners: Male     Birth control/protection: Condom     No Known Allergies  There is no problem list on file for this patient.    Reviewed medications, social history and  past medical and surgical history.    Review of system: for general, respiratory, CVS, GI and psychiatry negative except for noted above.     EXAM:  BP 96/78 (BP Location: Right arm, Patient Position: Sitting, Cuff Size: Adult Large)   Pulse 80   Temp 96.8  F (36  C) (Oral)   Resp 16   Ht 1.829 m (6')   Wt 80.5 kg (177 lb 8 oz)   SpO2 95%   BMI 24.07 kg/m    Constitutional: healthy, alert and no distress   Psychiatric: mentation appears normal and affect normal/bright  On his chin on the right side around 2 cm x 3 cm hairless slightly raised skin colored lesion present.  Upon palpation there is 0.5 cm x 1 cm nontender freely mobile subcutaneous lesion present.    ASSESSMENT / PLAN:  (L98.9) Skin lesion  (primary encounter diagnosis)  Comment: I am not entirely sure if his ingrown hair versus a cyst.  We will try topical  antibiotics and heat but if it fails we will ask him to see a dermatologist.  Referral given.  Plan: DERMATOLOGY REFERRAL             (Z23) Need for prophylactic vaccination and inoculation against influenza  Comment:    Plan: INFLUENZA VACCINE IM > 6 MONTHS VALENT IIV4         [[id number]], Vaccine Administration, Initial         [12101]               No follow-ups on file.      The above note was dictated using voice recognition. Although reviewed after completion, some word and grammatical error may remain .

## 2019-11-20 ASSESSMENT — ANXIETY QUESTIONNAIRES: GAD7 TOTAL SCORE: 2

## 2020-05-26 ENCOUNTER — TRANSFERRED RECORDS (OUTPATIENT)
Dept: HEALTH INFORMATION MANAGEMENT | Facility: CLINIC | Age: 30
End: 2020-05-26

## 2020-07-11 DIAGNOSIS — F41.1 GAD (GENERALIZED ANXIETY DISORDER): ICD-10-CM

## 2020-07-11 DIAGNOSIS — F33.1 MODERATE EPISODE OF RECURRENT MAJOR DEPRESSIVE DISORDER (H): ICD-10-CM

## 2020-07-14 RX ORDER — CITALOPRAM HYDROBROMIDE 20 MG/1
TABLET ORAL
Qty: 30 TABLET | Refills: 0 | Status: SHIPPED | OUTPATIENT
Start: 2020-07-14 | End: 2020-08-20

## 2020-07-14 RX ORDER — BUPROPION HYDROCHLORIDE 300 MG/1
TABLET ORAL
Qty: 30 TABLET | Refills: 0 | Status: SHIPPED | OUTPATIENT
Start: 2020-07-14 | End: 2020-08-20

## 2020-07-14 NOTE — TELEPHONE ENCOUNTER
Routing refill request to provider for review/approval because:  --RN protocol requires every six month office visit for depression meds.  --I will route to provider to decide if wants visit.  --Last Office : 11/19/19  --Due for PHQ-9.        MA  --Please contact patient for PHQ-9.  Thank you.          PHQ 8/17/2018 7/12/2019 11/19/2019   PHQ-9 Total Score 2 2 2   Q9: Thoughts of better off dead/self-harm past 2 weeks Not at all Not at all Not at all

## 2020-07-14 NOTE — TELEPHONE ENCOUNTER
Refill for 30 days.  Follow-up visit required for further evaluation before he runs out of Rx.  Virtual visit is okay.

## 2020-08-16 DIAGNOSIS — F41.1 GAD (GENERALIZED ANXIETY DISORDER): ICD-10-CM

## 2020-08-16 DIAGNOSIS — F33.1 MODERATE EPISODE OF RECURRENT MAJOR DEPRESSIVE DISORDER (H): ICD-10-CM

## 2020-08-20 ENCOUNTER — MYC REFILL (OUTPATIENT)
Dept: PEDIATRICS | Facility: CLINIC | Age: 30
End: 2020-08-20

## 2020-08-20 DIAGNOSIS — F33.1 MODERATE EPISODE OF RECURRENT MAJOR DEPRESSIVE DISORDER (H): ICD-10-CM

## 2020-08-20 DIAGNOSIS — F41.1 GAD (GENERALIZED ANXIETY DISORDER): ICD-10-CM

## 2020-08-21 ENCOUNTER — VIRTUAL VISIT (OUTPATIENT)
Dept: FAMILY MEDICINE | Facility: CLINIC | Age: 30
End: 2020-08-21
Payer: COMMERCIAL

## 2020-08-21 DIAGNOSIS — Z53.9 ERRONEOUS ENCOUNTER--DISREGARD: Primary | ICD-10-CM

## 2020-08-21 RX ORDER — BUPROPION HYDROCHLORIDE 300 MG/1
TABLET ORAL
Qty: 30 TABLET | Refills: 0 | OUTPATIENT
Start: 2020-08-21

## 2020-08-21 RX ORDER — BUPROPION HYDROCHLORIDE 300 MG/1
300 TABLET ORAL EVERY MORNING
Qty: 30 TABLET | Refills: 0 | Status: SHIPPED | OUTPATIENT
Start: 2020-08-21 | End: 2020-09-17

## 2020-08-21 RX ORDER — CITALOPRAM HYDROBROMIDE 20 MG/1
TABLET ORAL
Qty: 30 TABLET | Refills: 0 | OUTPATIENT
Start: 2020-08-21

## 2020-08-21 RX ORDER — BUPROPION HYDROCHLORIDE 300 MG/1
300 TABLET ORAL EVERY MORNING
Qty: 30 TABLET | Refills: 0 | Status: CANCELLED | OUTPATIENT
Start: 2020-08-21

## 2020-08-21 RX ORDER — CITALOPRAM HYDROBROMIDE 20 MG/1
20 TABLET ORAL DAILY
Qty: 30 TABLET | Refills: 0 | Status: SHIPPED | OUTPATIENT
Start: 2020-08-21 | End: 2020-09-17

## 2020-08-21 RX ORDER — CITALOPRAM HYDROBROMIDE 20 MG/1
20 TABLET ORAL DAILY
Qty: 30 TABLET | Refills: 0 | Status: CANCELLED | OUTPATIENT
Start: 2020-08-21

## 2020-08-21 ASSESSMENT — ANXIETY QUESTIONNAIRES
1. FEELING NERVOUS, ANXIOUS, OR ON EDGE: SEVERAL DAYS
2. NOT BEING ABLE TO STOP OR CONTROL WORRYING: MORE THAN HALF THE DAYS
5. BEING SO RESTLESS THAT IT IS HARD TO SIT STILL: NOT AT ALL
3. WORRYING TOO MUCH ABOUT DIFFERENT THINGS: SEVERAL DAYS
6. BECOMING EASILY ANNOYED OR IRRITABLE: NOT AT ALL
7. FEELING AFRAID AS IF SOMETHING AWFUL MIGHT HAPPEN: NOT AT ALL
GAD7 TOTAL SCORE: 5
IF YOU CHECKED OFF ANY PROBLEMS ON THIS QUESTIONNAIRE, HOW DIFFICULT HAVE THESE PROBLEMS MADE IT FOR YOU TO DO YOUR WORK, TAKE CARE OF THINGS AT HOME, OR GET ALONG WITH OTHER PEOPLE: NOT DIFFICULT AT ALL

## 2020-08-21 ASSESSMENT — PATIENT HEALTH QUESTIONNAIRE - PHQ9
5. POOR APPETITE OR OVEREATING: SEVERAL DAYS
SUM OF ALL RESPONSES TO PHQ QUESTIONS 1-9: 2

## 2020-08-21 NOTE — PROGRESS NOTES
"José Waldron is a 29 year old male who is being evaluated via a billable telephone visit.          What phone number would you like to be contacted at? [phone]    How would you like to obtain your AVS? Geovany Ruiz     José Waldron is a 29 year old male who presents via phone visit today for the following health issues:    HPI    Depression and Anxiety Follow-Up    How are you doing with your depression since your last visit? No change    How are you doing with your anxiety since your last visit?  No change    Are you having other symptoms that might be associated with depression or anxiety? No    Have you had a significant life event? No     Do you have any concerns with your use of alcohol or other drugs? No    Social History     Tobacco Use     Smoking status: Never Smoker     Smokeless tobacco: Never Used   Substance Use Topics     Alcohol use: Yes     Comment: 4 drinks per wlks     Drug use: Never     PHQ 7/12/2019 11/19/2019 8/21/2020   PHQ-9 Total Score 2 2 2   Q9: Thoughts of better off dead/self-harm past 2 weeks Not at all Not at all Not at all     BEN-7 SCORE 7/12/2019 11/19/2019 8/21/2020   Total Score 5 (mild anxiety) - -   Total Score 5 2 5         Suicide Assessment Five-step Evaluation and Treatment (SAFE-T)      The patient has been notified of following:     \"This telephone visit will be conducted via a call between you and your physician/provider. We have found that certain health care needs can be provided without the need for a physical exam.  This service lets us provide the care you need with a short phone conversation.  If a prescription is necessary we can send it directly to your pharmacy.  If lab work is needed we can place an order for that and you can then stop by our lab to have the test done at a later time.    Telephone visits are billed at different rates depending on your insurance coverage. During this emergency period, for some insurers they may be billed " "the same as an in-person visit.  Please reach out to your insurance provider with any questions.    If during the course of the call the physician/provider feels a telephone visit is not appropriate, you will not be charged for this service.\"    Patient has given verbal consent for Telephone visit?  Yes          We did not connect for this visit.  Justino Foster MD   "

## 2020-08-22 ASSESSMENT — ANXIETY QUESTIONNAIRES: GAD7 TOTAL SCORE: 5

## 2020-09-17 ENCOUNTER — TELEPHONE (OUTPATIENT)
Dept: FAMILY MEDICINE | Facility: CLINIC | Age: 30
End: 2020-09-17

## 2020-09-17 DIAGNOSIS — F41.1 GAD (GENERALIZED ANXIETY DISORDER): ICD-10-CM

## 2020-09-17 DIAGNOSIS — F33.1 MODERATE EPISODE OF RECURRENT MAJOR DEPRESSIVE DISORDER (H): ICD-10-CM

## 2020-09-17 RX ORDER — CITALOPRAM HYDROBROMIDE 20 MG/1
20 TABLET ORAL DAILY
Qty: 15 TABLET | Refills: 0 | Status: SHIPPED | OUTPATIENT
Start: 2020-09-17 | End: 2020-09-25

## 2020-09-17 RX ORDER — BUPROPION HYDROCHLORIDE 300 MG/1
300 TABLET ORAL EVERY MORNING
Qty: 15 TABLET | Refills: 0 | Status: SHIPPED | OUTPATIENT
Start: 2020-09-17 | End: 2020-09-25

## 2020-09-17 NOTE — TELEPHONE ENCOUNTER
Routing refill request to provider for review/approval because:  [place] given x2 and patient did not follow up, please advise    On 7/14/2020 and again on 8/20/2020 patient requested refills. Both times patient was told a 1 month supply was sent and he had to make an appointment for further refills.  Patient has not complied.  Wendy Calderon, RN    I did leave message for patient to call the clinic back to verify pharmacy (I loaded pharmacy from August refill)

## 2020-09-17 NOTE — TELEPHONE ENCOUNTER
Reason for call:  Other   Patient called regarding (reason for call): prescription  Additional comments: Patient is requesting a refill for his wellbutrin and citalopram prescriptions if possible    Phone number to reach patient:  Cell number on file:    Telephone Information:   Mobile [phone]       Best Time:  any    Can we leave a detailed message on this number?  YES    Travel screening: Negative

## 2020-09-17 NOTE — TELEPHONE ENCOUNTER
"I would say needs appointment for refills.  But can defer to PCP back on Monday.  Thanks  Edelmira \"Miguel\" HANK Whitt   "

## 2020-09-17 NOTE — TELEPHONE ENCOUNTER
Patient returned call. He is already scheduled for an appointment with PCP on 9/25/2020. Confirmed pharmacy as [id number] - [place], [place] - [address] [facility] [address]. Please proceed. Thanks!

## 2020-09-18 NOTE — TELEPHONE ENCOUNTER
"Left message that \"prescriptions\" had been refilled to get him to his appointment on 9/25/2020.  Call if further questions otherwise we will see him on the 25th.  Wendy Calderon RN  "

## 2020-09-25 ENCOUNTER — VIRTUAL VISIT (OUTPATIENT)
Dept: FAMILY MEDICINE | Facility: CLINIC | Age: 30
End: 2020-09-25
Payer: COMMERCIAL

## 2020-09-25 DIAGNOSIS — F41.1 GAD (GENERALIZED ANXIETY DISORDER): ICD-10-CM

## 2020-09-25 DIAGNOSIS — F33.1 MODERATE EPISODE OF RECURRENT MAJOR DEPRESSIVE DISORDER (H): ICD-10-CM

## 2020-09-25 PROCEDURE — 96127 BRIEF EMOTIONAL/BEHAV ASSMT: CPT | Performed by: FAMILY MEDICINE

## 2020-09-25 PROCEDURE — 99214 OFFICE O/P EST MOD 30 MIN: CPT | Mod: 95 | Performed by: FAMILY MEDICINE

## 2020-09-25 RX ORDER — CITALOPRAM HYDROBROMIDE 20 MG/1
20 TABLET ORAL DAILY
Qty: 90 TABLET | Refills: 3 | Status: SHIPPED | OUTPATIENT
Start: 2020-09-25 | End: 2021-10-08

## 2020-09-25 RX ORDER — BUPROPION HYDROCHLORIDE 300 MG/1
300 TABLET ORAL EVERY MORNING
Qty: 90 TABLET | Refills: 3 | Status: SHIPPED | OUTPATIENT
Start: 2020-09-25 | End: 2021-10-08

## 2020-09-25 ASSESSMENT — ANXIETY QUESTIONNAIRES
GAD7 TOTAL SCORE: 4
7. FEELING AFRAID AS IF SOMETHING AWFUL MIGHT HAPPEN: SEVERAL DAYS
3. WORRYING TOO MUCH ABOUT DIFFERENT THINGS: SEVERAL DAYS
5. BEING SO RESTLESS THAT IT IS HARD TO SIT STILL: NOT AT ALL
IF YOU CHECKED OFF ANY PROBLEMS ON THIS QUESTIONNAIRE, HOW DIFFICULT HAVE THESE PROBLEMS MADE IT FOR YOU TO DO YOUR WORK, TAKE CARE OF THINGS AT HOME, OR GET ALONG WITH OTHER PEOPLE: NOT DIFFICULT AT ALL
6. BECOMING EASILY ANNOYED OR IRRITABLE: NOT AT ALL
2. NOT BEING ABLE TO STOP OR CONTROL WORRYING: SEVERAL DAYS
1. FEELING NERVOUS, ANXIOUS, OR ON EDGE: SEVERAL DAYS

## 2020-09-25 ASSESSMENT — PATIENT HEALTH QUESTIONNAIRE - PHQ9
SUM OF ALL RESPONSES TO PHQ QUESTIONS 1-9: 3
5. POOR APPETITE OR OVEREATING: NOT AT ALL

## 2020-09-25 NOTE — PROGRESS NOTES
"José Waldron is a 29 year old male who is being evaluated via a billable telephone visit.      The patient has been notified of following:     \"This telephone visit will be conducted via a call between you and your physician/provider. We have found that certain health care needs can be provided without the need for a physical exam.  This service lets us provide the care you need with a short phone conversation.  If a prescription is necessary we can send it directly to your pharmacy.  If lab work is needed we can place an order for that and you can then stop by our lab to have the test done at a later time.    Telephone visits are billed at different rates depending on your insurance coverage. During this emergency period, for some insurers they may be billed the same as an in-person visit.  Please reach out to your insurance provider with any questions.    If during the course of the call the physician/provider feels a telephone visit is not appropriate, you will not be charged for this service.\"    Patient has given verbal consent for Telephone visit?  Yes    What phone number would you like to be contacted at? [phone]    How would you like to obtain your AVS? Geovany Ruiz     José Waldron is a 29 year old male who presents via phone visit today for the following health issues:    HPI    Depression and Anxiety Follow-Up    How are you doing with your depression since your last visit? No change doing ok    How are you doing with your anxiety since your last visit?  No change    Are you having other symptoms that might be associated with depression or anxiety? No    Have you had a significant life event? No     Do you have any concerns with your use of alcohol or other drugs? No    Social History     Tobacco Use     Smoking status: Never Smoker     Smokeless tobacco: Never Used   Substance Use Topics     Alcohol use: Yes     Comment: 4 drinks per wlks     Drug use: Never     PHQ 7/12/2019 " 11/19/2019 8/21/2020   PHQ-9 Total Score 2 2 2   Q9: Thoughts of better off dead/self-harm past 2 weeks Not at all Not at all Not at all     BEN-7 SCORE 7/12/2019 11/19/2019 8/21/2020   Total Score 5 (mild anxiety) - -   Total Score 5 2 5     Last PHQ-9 9/25/2020   1.  Little interest or pleasure in doing things 0   2.  Feeling down, depressed, or hopeless 1   3.  Trouble falling or staying asleep, or sleeping too much 0   4.  Feeling tired or having little energy 0   5.  Poor appetite or overeating 0   6.  Feeling bad about yourself 1   7.  Trouble concentrating 1   8.  Moving slowly or restless 0   Q9: Thoughts of better off dead/self-harm past 2 weeks 0   PHQ-9 Total Score 3   Difficulty at work, home, or with people Somewhat difficult     BEN-7  9/25/2020   1. Feeling nervous, anxious, or on edge 1   2. Not being able to stop or control worrying 1   3. Worrying too much about different things 1   4. Trouble relaxing 0   5. Being so restless that it is hard to sit still 0   6. Becoming easily annoyed or irritable 0   7. Feeling afraid, as if something awful might happen 1   BEN-7 Total Score 4   If you checked any problems, how difficult have they made it for you to do your work, take care of things at home, or get along with other people? Not difficult at all       Suicide Assessment Five-step Evaluation and Treatment (SAFE-T)      How many servings of fruits and vegetables do you eat daily?  2-3    On average, how many sweetened beverages do you drink each day (Examples: soda, juice, sweet tea, etc.  Do NOT count diet or artificially sweetened beverages)?   0    How many days per week do you exercise enough to make your heart beat faster? 4    How many minutes a day do you exercise enough to make your heart beat faster? 30 - 60    How many days per week do you miss taking your medication? 0    Working from home.   No known sick contact.   Feels being introverted is helping with covid.     No side effects from  medications.   Worried about election and overall worlds status.     Review of Systems   Constitutional, HEENT, cardiovascular, pulmonary, gi and gu systems are negative, except as otherwise noted.       Objective          Vitals:  No vitals were obtained today due to virtual visit.    healthy, alert and no distress  PSYCH: Alert and oriented times 3; coherent speech, normal   rate and volume, able to articulate logical thoughts, able   to abstract reason, no tangential thoughts, no hallucinations   or delusions  His affect is normal  RESP: No cough, no audible wheezing, able to talk in full sentences  Remainder of exam unable to be completed due to telephone visits      Assessment/Plan:    Assessment & Plan     Moderate episode of recurrent major depressive disorder (H)  Patient is tolerating current medication without any major side effects of concerns and current dose seems reasonable too.  Current medication regime is effective. Continue current treatment without any changes.    - buPROPion (WELLBUTRIN XL) 300 MG 24 hr tablet; Take 1 tablet (300 mg) by mouth every morning  - citalopram (CELEXA) 20 MG tablet; Take 1 tablet (20 mg) by mouth daily    BEN (generalized anxiety disorder)  Patient is tolerating current medication without any major side effects of concerns and current dose seems reasonable too.  Current medication regime is effective. Continue current treatment without any changes.   - buPROPion (WELLBUTRIN XL) 300 MG 24 hr tablet; Take 1 tablet (300 mg) by mouth every morning  - citalopram (CELEXA) 20 MG tablet; Take 1 tablet (20 mg) by mouth daily     Follow up in 6 months.     Levy Madrid MD, MD  [facility]    Phone call duration:  9 minutes

## 2020-09-26 ASSESSMENT — ANXIETY QUESTIONNAIRES: GAD7 TOTAL SCORE: 4

## 2021-01-03 ENCOUNTER — HEALTH MAINTENANCE LETTER (OUTPATIENT)
Age: 31
End: 2021-01-03

## 2021-10-05 DIAGNOSIS — F33.1 MODERATE EPISODE OF RECURRENT MAJOR DEPRESSIVE DISORDER (H): ICD-10-CM

## 2021-10-05 DIAGNOSIS — F41.1 GAD (GENERALIZED ANXIETY DISORDER): ICD-10-CM

## 2021-10-05 NOTE — TELEPHONE ENCOUNTER
Reason for call:  Other   Patient called regarding (reason for call): prescription  Additional comments: pt has appointment scheduled 11/2 for med refills, will run out of meds before appt. Can he get a 30 day refills sent in to be able to have meds until appt    Phone number to reach patient:  Home number on file [phone] (home)    Best Time:  any    Can we leave a detailed message on this number?  YES    Travel screening: Not Applicable

## 2021-10-08 RX ORDER — BUPROPION HYDROCHLORIDE 300 MG/1
300 TABLET ORAL EVERY MORNING
Qty: 90 TABLET | Refills: 0 | Status: SHIPPED | OUTPATIENT
Start: 2021-10-08 | End: 2021-11-02

## 2021-10-08 RX ORDER — CITALOPRAM HYDROBROMIDE 20 MG/1
20 TABLET ORAL DAILY
Qty: 90 TABLET | Refills: 0 | Status: SHIPPED | OUTPATIENT
Start: 2021-10-08 | End: 2021-11-02

## 2021-10-08 NOTE — TELEPHONE ENCOUNTER
Refill on both meds x 1 given. Pt overdue for visit but has scheduled for 11/2/21 and will be out today.  Called pt to relay this information.    HAWK Mccurdy RN  [facility]

## 2021-10-08 NOTE — TELEPHONE ENCOUNTER
Refill request was routed incorrectly so it has been sitting for 3 days unaddressed. Patient is out of medication. He is scheduled to see PCP on 11/2/2021 his next available opening. Please assist. Thanks! Patient worried about going the weekend without meds.

## 2021-10-10 ENCOUNTER — HEALTH MAINTENANCE LETTER (OUTPATIENT)
Age: 31
End: 2021-10-10

## 2021-10-27 ENCOUNTER — OFFICE VISIT (OUTPATIENT)
Dept: URGENT CARE | Facility: URGENT CARE | Age: 31
End: 2021-10-27
Payer: COMMERCIAL

## 2021-10-27 VITALS
HEART RATE: 78 BPM | TEMPERATURE: 99.3 F | SYSTOLIC BLOOD PRESSURE: 126 MMHG | OXYGEN SATURATION: 97 % | BODY MASS INDEX: 23.33 KG/M2 | HEIGHT: 73 IN | WEIGHT: 176 LBS | DIASTOLIC BLOOD PRESSURE: 74 MMHG

## 2021-10-27 DIAGNOSIS — R07.0 THROAT PAIN: Primary | ICD-10-CM

## 2021-10-27 DIAGNOSIS — Z20.818 STREPTOCOCCUS EXPOSURE: ICD-10-CM

## 2021-10-27 LAB — DEPRECATED S PYO AG THROAT QL EIA: NEGATIVE

## 2021-10-27 PROCEDURE — 87651 STREP A DNA AMP PROBE: CPT | Performed by: FAMILY MEDICINE

## 2021-10-27 PROCEDURE — 99213 OFFICE O/P EST LOW 20 MIN: CPT | Performed by: FAMILY MEDICINE

## 2021-10-27 RX ORDER — ACETAMINOPHEN 325 MG/1
325-650 TABLET ORAL EVERY 6 HOURS PRN
COMMUNITY

## 2021-10-27 ASSESSMENT — MIFFLIN-ST. JEOR: SCORE: 1822.21

## 2021-10-27 NOTE — PROGRESS NOTES
Chief Complaint   Patient presents with     Urgent Care     Pharyngitis     Scratchy throat and runny nose started today; has had a slight headache.  Exposed to strep throat recently.  Fully vaccinated against COVID.     Initial differential diagnosis included but was not restricted to strep   Differential Diagnosis:  URI Adult/Peds:  Sinusitis, Strep pharyngitis, Tonsilitis, Viral pharyngitis, Viral syndrome and Viral upper respiratory illness  Medical Decision Making:    ASSESMENT AND PLAN   José was seen today for urgent care and pharyngitis.    Diagnoses and all orders for this visit:    Throat pain  -     Streptococcus A Rapid Screen w/Reflex to PCR - Clinic Collect    Streptococcus exposure    Discussed with patient about the negative strep test result advised that the culture will be back tomorrow.  Discussed with patient if notices any worsening sore throat coughing or any change in taste or smell should get tested for Covid.  Tylenol, Fluids, Rest, Saline gargles and Vaporizer  Routine discharge counseling was given to the patient and the patient understands that worsening, changing or persistent symptoms should prompt an immediate call or follow up with their primary physician or the emergency department. The importance of appropriate follow up was also discussed with the patient.     I have reviewed the nursing notes.    Review of the result(s) of each unique test - strep testb    X-Ray was not done.    Time  spent on the date of the encounter doing chart review, review of test results, interpretation of tests, patient visit and documentation     see orders in Epic  Pt verbalized and agreed with the plan and is aware of the worsening symptoms for which would need to follow up .  Pt was stable during time of discharge from the clinic     SUBJECTIVE     José Waldron is a 30 year old male presenting with a chief complaint of    Chief Complaint   Patient presents with     Urgent Care      "Pharyngitis     Scratchy throat and runny nose started today; has had a slight headache.  Exposed to strep throat recently.  Fully vaccinated against COVID.           José Waldron is a 30 year old male presenting with a chief complaint of runny nose and sore throat. He is an established patient of [facility].  Onset of symptoms was today  Course of illness is same.    Severity moderate  Current and Associated symptoms: sore throat  Treatment measures tried include Tylenol/Ibuprofen.  Predisposing factors include exposure to strep.    Past Medical History:   Diagnosis Date     Anxiety      Depression      Current Outpatient Medications   Medication Sig Dispense Refill     acetaminophen (TYLENOL) 325 MG tablet Take 325-650 mg by mouth every 6 hours as needed for mild pain       buPROPion (WELLBUTRIN XL) 300 MG 24 hr tablet Take 1 tablet (300 mg) by mouth every morning 90 tablet 0     citalopram (CELEXA) 20 MG tablet Take 1 tablet (20 mg) by mouth daily 90 tablet 0     Social History     Tobacco Use     Smoking status: Never Smoker     Smokeless tobacco: Never Used   Substance Use Topics     Alcohol use: Yes     Comment: 4 drinks per wlks     Family History   Problem Relation Age of Onset     Breast Cancer Mother      Alcoholism Father      Depression Father          ROS:    10 point ROS of systems including Constitutional, Eyes,Cardiovascular, Gastroenterology, Genitourinary, Integumentary, Muscularskeletal, Psychiatric ,neurological were all negative except for pertinent positives noted in my HPI         OBJECTIVE:    /74   Pulse 78   Temp 99.3  F (37.4  C) (Oral)   Ht 1.854 m (6' 1\")   Wt 79.8 kg (176 lb)   SpO2 97%   BMI 23.22 kg/m    GENERAL APPEARANCE: healthy, alert and no distress  EYES: EOMI,  PERRL, conjunctiva clear  HENT: ear canals and TM's normal.  Nose and mouth without ulcers, erythema or lesions  NECK: supple, nontender, no lymphadenopathy  RESP: lungs clear to auscultation - no rales, " rhonchi or wheezes  CV: regular rates and rhythm, normal S1 S2, no murmur noted  SKIN: no suspicious lesions or rashes  PSYCH: mentation appears normal  Physical Exam      (Note was completed, in part, with eucl3D voice-recognition software. Documentation reviewed, but some grammatical, spelling, and word errors may remain.)  Cris Means MD on 10/27/2021 at 5:46 PM

## 2021-10-28 DIAGNOSIS — J02.0 STREPTOCOCCAL PHARYNGITIS: Primary | ICD-10-CM

## 2021-10-28 LAB — GROUP A STREP BY PCR: DETECTED

## 2021-10-28 RX ORDER — AMOXICILLIN 500 MG/1
500 CAPSULE ORAL 2 TIMES DAILY
Qty: 20 CAPSULE | Refills: 0 | Status: SHIPPED | OUTPATIENT
Start: 2021-10-28 | End: 2021-11-07

## 2021-10-29 NOTE — CONFIDENTIAL NOTE
Strep PCR was positive amoxicillin order placed and sent to the pharmacy of choice at the time urgent care visit-Ade Zimmerman MD

## 2021-11-02 ENCOUNTER — OFFICE VISIT (OUTPATIENT)
Dept: FAMILY MEDICINE | Facility: CLINIC | Age: 31
End: 2021-11-02
Payer: COMMERCIAL

## 2021-11-02 VITALS
BODY MASS INDEX: 23.59 KG/M2 | DIASTOLIC BLOOD PRESSURE: 72 MMHG | WEIGHT: 178 LBS | HEART RATE: 80 BPM | TEMPERATURE: 98 F | RESPIRATION RATE: 16 BRPM | SYSTOLIC BLOOD PRESSURE: 116 MMHG | HEIGHT: 73 IN | OXYGEN SATURATION: 97 %

## 2021-11-02 DIAGNOSIS — F41.1 GAD (GENERALIZED ANXIETY DISORDER): ICD-10-CM

## 2021-11-02 DIAGNOSIS — F33.1 MODERATE EPISODE OF RECURRENT MAJOR DEPRESSIVE DISORDER (H): Primary | ICD-10-CM

## 2021-11-02 DIAGNOSIS — Z13.220 SCREENING FOR HYPERLIPIDEMIA: ICD-10-CM

## 2021-11-02 DIAGNOSIS — Z23 HIGH PRIORITY FOR 2019-NCOV VACCINE: ICD-10-CM

## 2021-11-02 PROCEDURE — 99214 OFFICE O/P EST MOD 30 MIN: CPT | Mod: 25 | Performed by: FAMILY MEDICINE

## 2021-11-02 PROCEDURE — 91301 COVID-19,PF,MODERNA (18+ YRS BOOSTER .25ML): CPT | Performed by: FAMILY MEDICINE

## 2021-11-02 PROCEDURE — 0064A COVID-19,PF,MODERNA (18+ YRS BOOSTER .25ML): CPT | Performed by: FAMILY MEDICINE

## 2021-11-02 RX ORDER — BUPROPION HYDROCHLORIDE 300 MG/1
300 TABLET ORAL EVERY MORNING
Qty: 90 TABLET | Refills: 0 | Status: SHIPPED | OUTPATIENT
Start: 2021-11-02 | End: 2021-11-02

## 2021-11-02 RX ORDER — CITALOPRAM HYDROBROMIDE 20 MG/1
20 TABLET ORAL DAILY
Qty: 90 TABLET | Refills: 2 | Status: SHIPPED | OUTPATIENT
Start: 2022-01-01 | End: 2022-10-11

## 2021-11-02 RX ORDER — CITALOPRAM HYDROBROMIDE 20 MG/1
20 TABLET ORAL DAILY
Qty: 90 TABLET | Refills: 0 | Status: SHIPPED | OUTPATIENT
Start: 2021-11-02 | End: 2021-11-02

## 2021-11-02 RX ORDER — BUPROPION HYDROCHLORIDE 300 MG/1
300 TABLET ORAL EVERY MORNING
Qty: 90 TABLET | Refills: 2 | Status: SHIPPED | OUTPATIENT
Start: 2022-01-01 | End: 2023-01-10

## 2021-11-02 ASSESSMENT — PATIENT HEALTH QUESTIONNAIRE - PHQ9: SUM OF ALL RESPONSES TO PHQ QUESTIONS 1-9: 2

## 2021-11-02 ASSESSMENT — MIFFLIN-ST. JEOR: SCORE: 1831.28

## 2021-11-02 NOTE — PATIENT INSTRUCTIONS
Did you know?      You can schedule a video visit for follow-up appointments as well as future appointments for certain conditions.  Please see the below link.     [email or web address]    If you have not already done so,  I encourage you to sign up for [employer] ([email or web address]).  This will allow you to review your results, securely communicate with a provider, and schedule virtual visits as well.

## 2021-11-02 NOTE — PROGRESS NOTES
Assessment & Plan     Moderate episode of recurrent major depressive disorder (H)  He is doing well overall with current medication and we agreed to continue the same without change in his medication.  - buPROPion (WELLBUTRIN XL) 300 MG 24 hr tablet; Take 1 tablet (300 mg) by mouth every morning  - citalopram (CELEXA) 20 MG tablet; Take 1 tablet (20 mg) by mouth daily    BEN (generalized anxiety disorder)  Patient is tolerating current medication without any major side effects of concerns and current dose seems reasonable too.  Current medication regime is effective. Continue current treatment without any changes.   - buPROPion (WELLBUTRIN XL) 300 MG 24 hr tablet; Take 1 tablet (300 mg) by mouth every morning  - citalopram (CELEXA) 20 MG tablet; Take 1 tablet (20 mg) by mouth daily    Screening for hyperlipidemia     - Lipid panel reflex to direct LDL Non-fasting; Future    High priority for 2019-nCoV vaccine     - COVID-19,PF,MODERNA (18+ Yrs BOOSTER .25mL)                 No follow-ups on file.    Levy Madrid MD, MD  [facility]    Joseph Kumar is a 30 year old who presents for the following health issues     HPI     Depression and Anxiety Follow-Up    How are you doing with your depression since your last visit? No change    How are you doing with your anxiety since your last visit?  No change    Are you having other symptoms that might be associated with depression or anxiety? No    Have you had a significant life event? No     Do you have any concerns with your use of alcohol or other drugs? No    Social History     Tobacco Use     Smoking status: Never Smoker     Smokeless tobacco: Never Used   Substance Use Topics     Alcohol use: Yes     Comment: 4 drinks per wlks     Drug use: Never     PHQ 11/19/2019 8/21/2020 9/25/2020   PHQ-9 Total Score 2 2 3   Q9: Thoughts of better off dead/self-harm past 2 weeks Not at all Not at all Not at all     BEN-7 SCORE  "11/19/2019 8/21/2020 9/25/2020   Total Score - - -   Total Score 2 5 4         Suicide Assessment Five-step Evaluation and Treatment (SAFE-T)      Had recent strep. Doing well.     Feels current regime is fine.   Seeing a therapist - new - couple of sessions   Was doing fine during pandemic.   No suicidal thoughts. No concern of safety.     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       Review of Systems         Objective    /72 (BP Location: Right arm, Patient Position: Sitting, Cuff Size: Adult Regular)   Pulse 80   Temp 98  F (36.7  C) (Oral)   Resp 16   Ht 1.854 m (6' 1\")   Wt 80.7 kg (178 lb)   SpO2 97%   BMI 23.48 kg/m    Body mass index is 23.48 kg/m .  Physical Exam                     "

## 2022-01-29 ENCOUNTER — HEALTH MAINTENANCE LETTER (OUTPATIENT)
Age: 32
End: 2022-01-29

## 2022-09-18 ENCOUNTER — HEALTH MAINTENANCE LETTER (OUTPATIENT)
Age: 32
End: 2022-09-18

## 2022-10-09 DIAGNOSIS — F41.1 GAD (GENERALIZED ANXIETY DISORDER): ICD-10-CM

## 2022-10-09 DIAGNOSIS — F33.1 MODERATE EPISODE OF RECURRENT MAJOR DEPRESSIVE DISORDER (H): ICD-10-CM

## 2022-10-11 RX ORDER — CITALOPRAM HYDROBROMIDE 20 MG/1
TABLET ORAL
Qty: 90 TABLET | Refills: 0 | Status: SHIPPED | OUTPATIENT
Start: 2022-10-11 | End: 2023-01-10

## 2022-10-11 NOTE — TELEPHONE ENCOUNTER
PHQ-9 sent via [employer] and an appointment reminder for November.,    --Last visit:  11/2/2021 [place] plan RTC one year.    --Future Visit: none.      PHQ 8/21/2020 9/25/2020 11/2/2021   PHQ-9 Total Score 2 3 2   Q9: Thoughts of better off dead/self-harm past 2 weeks Not at all Not at all Not at all

## 2022-11-30 ENCOUNTER — OFFICE VISIT (OUTPATIENT)
Dept: URGENT CARE | Facility: URGENT CARE | Age: 32
End: 2022-11-30
Payer: COMMERCIAL

## 2022-11-30 VITALS
DIASTOLIC BLOOD PRESSURE: 78 MMHG | HEART RATE: 75 BPM | TEMPERATURE: 98.1 F | WEIGHT: 178 LBS | OXYGEN SATURATION: 98 % | BODY MASS INDEX: 23.48 KG/M2 | SYSTOLIC BLOOD PRESSURE: 114 MMHG

## 2022-11-30 DIAGNOSIS — L02.03 CARBUNCLE OF FACE: Primary | ICD-10-CM

## 2022-11-30 PROCEDURE — 99213 OFFICE O/P EST LOW 20 MIN: CPT | Performed by: INTERNAL MEDICINE

## 2022-11-30 RX ORDER — SULFAMETHOXAZOLE/TRIMETHOPRIM 800-160 MG
1 TABLET ORAL 2 TIMES DAILY
Qty: 14 TABLET | Refills: 0 | Status: SHIPPED | OUTPATIENT
Start: 2022-11-30 | End: 2022-12-07

## 2022-12-01 NOTE — PROGRESS NOTES
ASSESSMENT AND PLAN:      ICD-10-CM    1. Carbuncle of face  L02.03 sulfamethoxazole-trimethoprim (BACTRIM DS) 800-160 MG tablet          PLAN:      Patient Instructions                     Tania Santiago MD  [facility] URGENT [facility]ARE    Subjective     José Waldron is a 32 year old who presents for Patient presents with:  Urgent Care  Infection: C/O ingrown hair infected on face for 2 days    an established patient of [facility].      Onset of symptoms was 2 day(s) ago.    Location: left chin   Concern ingrown hair/infected cyst  Current and Associated symptoms: redness, swelling and pain  Treatment measures tried include: antibiotic ointment, picked at it a little  Relief from treatment: none  Significant past medical history: N/A        Objective    /78   Pulse 75   Temp 98.1  F (36.7  C) (Tympanic)   Wt 80.7 kg (178 lb)   SpO2 98%   BMI 23.48 kg/m    Physical Exam  Vitals reviewed.   Constitutional:       Appearance: Normal appearance. He is not ill-appearing.   Skin:     Comments: left lower jaw  3 cm red area, firm tenderness  2 mm open sore from picking    Few other red papules with white heads   Neurological:      Mental Status: He is alert.

## 2022-12-22 ENCOUNTER — OFFICE VISIT (OUTPATIENT)
Dept: URGENT CARE | Facility: URGENT CARE | Age: 32
End: 2022-12-22
Payer: COMMERCIAL

## 2022-12-22 VITALS
OXYGEN SATURATION: 96 % | SYSTOLIC BLOOD PRESSURE: 123 MMHG | BODY MASS INDEX: 22.43 KG/M2 | HEART RATE: 82 BPM | TEMPERATURE: 97.7 F | RESPIRATION RATE: 16 BRPM | WEIGHT: 170 LBS | DIASTOLIC BLOOD PRESSURE: 80 MMHG

## 2022-12-22 DIAGNOSIS — L02.93 CARBUNCLE: Primary | ICD-10-CM

## 2022-12-22 PROCEDURE — 99213 OFFICE O/P EST LOW 20 MIN: CPT | Performed by: FAMILY MEDICINE

## 2022-12-22 RX ORDER — SULFAMETHOXAZOLE/TRIMETHOPRIM 800-160 MG
1 TABLET ORAL 2 TIMES DAILY
Qty: 20 TABLET | Refills: 0 | Status: SHIPPED | OUTPATIENT
Start: 2022-12-22 | End: 2023-01-01

## 2022-12-23 NOTE — PROGRESS NOTES
Assessment & Plan     Carbuncle  Start on anbx  - sulfamethoxazole-trimethoprim (BACTRIM DS) 800-160 MG tablet  Dispense: 20 tablet; Refill: 0             No follow-ups on file.    Royer Harvey MD  [facility] [place]    Joseph Kumar is a 32 year old male who presents to clinic today for the following health issues:  Chief Complaint   Patient presents with     Urgent Care     Early Dec. Boil on face was seen and bactrim was prescribed. Now another new one comes back.      HPI    Swelling of face  24 hours it came on.  Some hot pack        Review of Systems        Objective    /80   Pulse 82   Temp 97.7  F (36.5  C) (Temporal)   Resp 16   Wt 77.1 kg (170 lb)   SpO2 96%   BMI 22.43 kg/m    Physical Exam  Vitals and nursing note reviewed.   Constitutional:       Appearance: Normal appearance.   Skin:     Comments: Hard firm red swelling left face   Neurological:      Mental Status: He is alert.

## 2022-12-30 ENCOUNTER — TELEPHONE (OUTPATIENT)
Dept: URGENT CARE | Facility: URGENT CARE | Age: 32
End: 2022-12-30

## 2022-12-30 NOTE — TELEPHONE ENCOUNTER
Left message for pt to call back re:  Letter from: Tania Santiago      Patient apparently saw Royer Harvey 1222 for boil.  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  I formed a letter.  Please inform patient     smiller,cma  Letter has been printed, need to know how patient would like to get letter.

## 2023-01-05 ASSESSMENT — ANXIETY QUESTIONNAIRES
4. TROUBLE RELAXING: NOT AT ALL
IF YOU CHECKED OFF ANY PROBLEMS ON THIS QUESTIONNAIRE, HOW DIFFICULT HAVE THESE PROBLEMS MADE IT FOR YOU TO DO YOUR WORK, TAKE CARE OF THINGS AT HOME, OR GET ALONG WITH OTHER PEOPLE: NOT DIFFICULT AT ALL
2. NOT BEING ABLE TO STOP OR CONTROL WORRYING: NOT AT ALL
5. BEING SO RESTLESS THAT IT IS HARD TO SIT STILL: NOT AT ALL
GAD7 TOTAL SCORE: 1
GAD7 TOTAL SCORE: 1
3. WORRYING TOO MUCH ABOUT DIFFERENT THINGS: SEVERAL DAYS
GAD7 TOTAL SCORE: 1
8. IF YOU CHECKED OFF ANY PROBLEMS, HOW DIFFICULT HAVE THESE MADE IT FOR YOU TO DO YOUR WORK, TAKE CARE OF THINGS AT HOME, OR GET ALONG WITH OTHER PEOPLE?: NOT DIFFICULT AT ALL
1. FEELING NERVOUS, ANXIOUS, OR ON EDGE: NOT AT ALL
6. BECOMING EASILY ANNOYED OR IRRITABLE: NOT AT ALL
7. FEELING AFRAID AS IF SOMETHING AWFUL MIGHT HAPPEN: NOT AT ALL
7. FEELING AFRAID AS IF SOMETHING AWFUL MIGHT HAPPEN: NOT AT ALL

## 2023-01-05 ASSESSMENT — PATIENT HEALTH QUESTIONNAIRE - PHQ9
SUM OF ALL RESPONSES TO PHQ QUESTIONS 1-9: 1
SUM OF ALL RESPONSES TO PHQ QUESTIONS 1-9: 1
10. IF YOU CHECKED OFF ANY PROBLEMS, HOW DIFFICULT HAVE THESE PROBLEMS MADE IT FOR YOU TO DO YOUR WORK, TAKE CARE OF THINGS AT HOME, OR GET ALONG WITH OTHER PEOPLE: NOT DIFFICULT AT ALL

## 2023-01-06 DIAGNOSIS — F33.1 MODERATE EPISODE OF RECURRENT MAJOR DEPRESSIVE DISORDER (H): ICD-10-CM

## 2023-01-06 DIAGNOSIS — F41.1 GAD (GENERALIZED ANXIETY DISORDER): ICD-10-CM

## 2023-01-10 ENCOUNTER — OFFICE VISIT (OUTPATIENT)
Dept: FAMILY MEDICINE | Facility: CLINIC | Age: 33
End: 2023-01-10
Payer: COMMERCIAL

## 2023-01-10 VITALS
WEIGHT: 166 LBS | DIASTOLIC BLOOD PRESSURE: 60 MMHG | TEMPERATURE: 97.2 F | BODY MASS INDEX: 22 KG/M2 | OXYGEN SATURATION: 97 % | RESPIRATION RATE: 20 BRPM | HEIGHT: 73 IN | SYSTOLIC BLOOD PRESSURE: 98 MMHG | HEART RATE: 82 BPM

## 2023-01-10 DIAGNOSIS — F41.1 GAD (GENERALIZED ANXIETY DISORDER): ICD-10-CM

## 2023-01-10 DIAGNOSIS — F33.1 MODERATE EPISODE OF RECURRENT MAJOR DEPRESSIVE DISORDER (H): Primary | ICD-10-CM

## 2023-01-10 PROCEDURE — 99214 OFFICE O/P EST MOD 30 MIN: CPT | Mod: 25 | Performed by: FAMILY MEDICINE

## 2023-01-10 PROCEDURE — 90715 TDAP VACCINE 7 YRS/> IM: CPT | Performed by: FAMILY MEDICINE

## 2023-01-10 PROCEDURE — 91313 COVID-19 VACCINE BIVALENT BOOSTER 18+ (MODERNA): CPT | Performed by: FAMILY MEDICINE

## 2023-01-10 PROCEDURE — 90471 IMMUNIZATION ADMIN: CPT | Performed by: FAMILY MEDICINE

## 2023-01-10 PROCEDURE — 0134A COVID-19 VACCINE BIVALENT BOOSTER 18+ (MODERNA): CPT | Performed by: FAMILY MEDICINE

## 2023-01-10 PROCEDURE — 96127 BRIEF EMOTIONAL/BEHAV ASSMT: CPT | Mod: 59 | Performed by: FAMILY MEDICINE

## 2023-01-10 RX ORDER — CITALOPRAM HYDROBROMIDE 20 MG/1
20 TABLET ORAL DAILY
Qty: 90 TABLET | Refills: 3 | Status: SHIPPED | OUTPATIENT
Start: 2023-01-10 | End: 2024-01-15

## 2023-01-10 RX ORDER — BUPROPION HYDROCHLORIDE 300 MG/1
300 TABLET ORAL EVERY MORNING
Qty: 90 TABLET | Refills: 3 | Status: SHIPPED | OUTPATIENT
Start: 2023-01-10 | End: 2024-01-23

## 2023-01-10 ASSESSMENT — ANXIETY QUESTIONNAIRES
3. WORRYING TOO MUCH ABOUT DIFFERENT THINGS: SEVERAL DAYS
7. FEELING AFRAID AS IF SOMETHING AWFUL MIGHT HAPPEN: SEVERAL DAYS
2. NOT BEING ABLE TO STOP OR CONTROL WORRYING: NOT AT ALL
IF YOU CHECKED OFF ANY PROBLEMS ON THIS QUESTIONNAIRE, HOW DIFFICULT HAVE THESE PROBLEMS MADE IT FOR YOU TO DO YOUR WORK, TAKE CARE OF THINGS AT HOME, OR GET ALONG WITH OTHER PEOPLE: NOT DIFFICULT AT ALL
1. FEELING NERVOUS, ANXIOUS, OR ON EDGE: SEVERAL DAYS
5. BEING SO RESTLESS THAT IT IS HARD TO SIT STILL: NOT AT ALL
6. BECOMING EASILY ANNOYED OR IRRITABLE: NOT AT ALL
GAD7 TOTAL SCORE: 1
GAD7 TOTAL SCORE: 3

## 2023-01-10 ASSESSMENT — PATIENT HEALTH QUESTIONNAIRE - PHQ9
10. IF YOU CHECKED OFF ANY PROBLEMS, HOW DIFFICULT HAVE THESE PROBLEMS MADE IT FOR YOU TO DO YOUR WORK, TAKE CARE OF THINGS AT HOME, OR GET ALONG WITH OTHER PEOPLE: NOT DIFFICULT AT ALL
5. POOR APPETITE OR OVEREATING: NOT AT ALL
SUM OF ALL RESPONSES TO PHQ QUESTIONS 1-9: 1

## 2023-01-10 ASSESSMENT — ENCOUNTER SYMPTOMS: NERVOUS/ANXIOUS: 1

## 2023-01-10 NOTE — PROGRESS NOTES
Assessment & Plan     Moderate episode of recurrent major depressive disorder (H)  Patient is tolerating current medication without any major side effects of concerns and current dose seems reasonable too.  Current medication regime is effective. Continue current treatment without any changes.   - buPROPion (WELLBUTRIN XL) 300 MG 24 hr tablet; Take 1 tablet (300 mg) by mouth every morning  - citalopram (CELEXA) 20 MG tablet; Take 1 tablet (20 mg) by mouth daily    BEN (generalized anxiety disorder)  Patient is tolerating current medication without any major side effects of concerns and current dose seems reasonable too.  Current medication regime is effective. Continue current treatment without any changes.   - buPROPion (WELLBUTRIN XL) 300 MG 24 hr tablet; Take 1 tablet (300 mg) by mouth every morning  - citalopram (CELEXA) 20 MG tablet; Take 1 tablet (20 mg) by mouth daily                 No follow-ups on file.    Levy Madrid MD, MD  [facility]    Joseph Kumar is a 32 year old accompanied by his alone, presenting for the following health issues:  Anxiety and Depression      Anxiety    History of Present Illness       Mental Health Follow-up:  Patient presents to follow-up on Depression & Anxiety.Patient's depression since last visit has been:  Good  The patient is not having other symptoms associated with depression.  Patient's anxiety since last visit has been:  Good  The patient is not having other symptoms associated with anxiety.  Any significant life events: No  Patient is not feeling anxious or having panic attacks.  Patient has no concerns about alcohol or drug use.    He eats 2-3 servings of fruits and vegetables daily.He consumes 1 sweetened beverage(s) daily.He exercises with enough effort to increase his heart rate 30 to 60 minutes per day.  He exercises with enough effort to increase his heart rate 4 days per week.   He is taking medications  "regularly.    Today's PHQ-9         PHQ-9 Total Score: 1    PHQ-9 Q9 Thoughts of better off dead/self-harm past 2 weeks :   Not at all    How difficult have these problems made it for you to do your work, take care of things at home, or get along with other people: Not difficult at all  Today's BEN-7 Score: 1     Mood and health - doing well.     Seeing a therapist.     Review of Systems   Psychiatric/Behavioral: The patient is nervous/anxious.             Objective    BP 98/60 (BP Location: Left arm, Patient Position: Sitting, Cuff Size: Adult Regular)   Pulse 82   Temp 97.2  F (36.2  C) (Temporal)   Resp 20   Ht 1.845 m (6' 0.64\")   Wt 75.3 kg (166 lb)   SpO2 97%   BMI 22.12 kg/m    Body mass index is 22.12 kg/m .  Physical Exam                       "

## 2023-01-10 NOTE — NURSING NOTE
Prior to immunization administration, verified patients identity using patient s name and date of birth. Please see Immunization Activity for additional information.     Screening Questionnaire for Adult Immunization    Are you sick today?   No   Do you have allergies to medications, food, a vaccine component or latex?   No   Have you ever had a serious reaction after receiving a vaccination?   No   Do you have a long-term health problem with heart, lung, kidney, or metabolic disease (e.g., diabetes), asthma, a blood disorder, no spleen, complement component deficiency, a cochlear implant, or a spinal fluid leak?  Are you on long-term aspirin therapy?   No   Do you have cancer, leukemia, HIV/AIDS, or any other immune system problem?   No   Do you have a parent, brother, or sister with an immune system problem?   No   In the past 3 months, have you taken medications that affect  your immune system, such as prednisone, other steroids, or anticancer drugs; drugs for the treatment of rheumatoid arthritis, Crohn s disease, or psoriasis; or have you had radiation treatments?   No   Have you had a seizure, or a brain or other nervous system problem?   No   During the past year, have you received a transfusion of blood or blood    products, or been given immune (gamma) globulin or antiviral drug?   No   For women: Are you pregnant or is there a chance you could become       pregnant during the next month?   No   Have you received any vaccinations in the past 4 weeks?   No     Immunization questionnaire answers were all negative.        Per orders of Dr. Madrid, injection of tdap/moderna given by Palmira Willoughby MA. Patient instructed to remain in clinic for 15 minutes afterwards, and to report any adverse reaction to me immediately.       Screening performed by Palmira Willoughby MA on 1/10/2023 at 9:03 AM.

## 2023-01-11 RX ORDER — BUPROPION HYDROCHLORIDE 300 MG/1
TABLET ORAL
Qty: 90 TABLET | Refills: 2 | OUTPATIENT
Start: 2023-01-11

## 2023-05-07 ENCOUNTER — HEALTH MAINTENANCE LETTER (OUTPATIENT)
Age: 33
End: 2023-05-07

## 2023-06-19 ENCOUNTER — OFFICE VISIT (OUTPATIENT)
Dept: URGENT CARE | Facility: URGENT CARE | Age: 33
End: 2023-06-19
Payer: COMMERCIAL

## 2023-06-19 VITALS
BODY MASS INDEX: 23.03 KG/M2 | OXYGEN SATURATION: 98 % | SYSTOLIC BLOOD PRESSURE: 117 MMHG | HEIGHT: 72 IN | DIASTOLIC BLOOD PRESSURE: 73 MMHG | WEIGHT: 170 LBS | TEMPERATURE: 98.8 F | HEART RATE: 103 BPM

## 2023-06-19 DIAGNOSIS — L02.811 CUTANEOUS ABSCESS OF HEAD EXCLUDING FACE: Primary | ICD-10-CM

## 2023-06-19 PROCEDURE — 99213 OFFICE O/P EST LOW 20 MIN: CPT | Performed by: PHYSICIAN ASSISTANT

## 2023-06-19 ASSESSMENT — ENCOUNTER SYMPTOMS
WOUND: 1
COLOR CHANGE: 1

## 2023-06-19 NOTE — PROGRESS NOTES
"SUBJECTIVE:   José Waldron is a 32 year old male presenting with a chief complaint of   Chief Complaint   Patient presents with     Urgent Care     Facial Swelling     Pt in clinic to have eval for facial swelling, redness and pain.       He is an established patient of [facility].   Patient presents with complaints of swelling, erythema and pain to left lower face on jaw line.  Patient states has happened about a month ago in another region on face.  No hx of MRSA.  He tried to \"squeeze\" the puss out.  No fevers.  Taking tylenol for pain.          Review of Systems   Skin: Positive for color change and wound.   All other systems reviewed and are negative.      Past Medical History:   Diagnosis Date     Anxiety      Depression      Family History   Problem Relation Age of Onset     Breast Cancer Mother      Alcoholism Father      Depression Father      Current Outpatient Medications   Medication Sig Dispense Refill     acetaminophen (TYLENOL) 325 MG tablet Take 325-650 mg by mouth every 6 hours as needed for mild pain       buPROPion (WELLBUTRIN XL) 300 MG 24 hr tablet Take 1 tablet (300 mg) by mouth every morning 90 tablet 3     citalopram (CELEXA) 20 MG tablet Take 1 tablet (20 mg) by mouth daily 90 tablet 3     Social History     Tobacco Use     Smoking status: Never     Smokeless tobacco: Never   Vaping Use     Vaping status: Not on file   Substance Use Topics     Alcohol use: Yes     Comment: 4 drinks per wlks       OBJECTIVE  /73   Pulse 103   Temp 98.8  F (37.1  C) (Temporal)   Ht 1.829 m (6')   Wt 77.1 kg (170 lb)   SpO2 98%   BMI 23.06 kg/m      Physical Exam  Vitals and nursing note reviewed.   Constitutional:       Appearance: Normal appearance. He is normal weight.   HENT:      Head: Normocephalic and atraumatic.   Eyes:      Extraocular Movements: Extraocular movements intact.      Conjunctiva/sclera: Conjunctivae normal.   Cardiovascular:      Rate and Rhythm: Normal rate.   Skin:     " Findings: Erythema present.      Comments: Left lower jaw with a 7-8 cm area of erythema, warmth and induration with a central scab.  No buoyance   Neurological:      Mental Status: He is alert.         Labs:  No results found for this or any previous visit (from the past 24 hour(s)).    X-Ray was not done.    ASSESSMENT:    No diagnosis found.     Medical Decision Making:    Differential Diagnosis:  Abscess, cellulitis    Serious Comorbid Conditions:  Adult:  reviewed    PLAN:    Rx for augmentin.  Discussed reasons to seek immediate medical attention.  Additionally if no improvement or worsening in one week, may follow up with PCP and/or UC.        Followup:    If not improving or if condition worsens, follow up with your Primary Care Provider, If not improving or if conditions worsens over the next 12-24 hours, go to the Emergency Department    There are no Patient Instructions on file for this visit.

## 2024-01-13 DIAGNOSIS — F33.1 MODERATE EPISODE OF RECURRENT MAJOR DEPRESSIVE DISORDER (H): ICD-10-CM

## 2024-01-13 DIAGNOSIS — F41.1 GAD (GENERALIZED ANXIETY DISORDER): ICD-10-CM

## 2024-01-15 RX ORDER — CITALOPRAM HYDROBROMIDE 20 MG/1
20 TABLET ORAL DAILY
Qty: 90 TABLET | Refills: 0 | Status: SHIPPED | OUTPATIENT
Start: 2024-01-15 | End: 2024-01-23

## 2024-01-22 DIAGNOSIS — F41.1 GAD (GENERALIZED ANXIETY DISORDER): ICD-10-CM

## 2024-01-22 DIAGNOSIS — F33.1 MODERATE EPISODE OF RECURRENT MAJOR DEPRESSIVE DISORDER (H): ICD-10-CM

## 2024-01-23 ENCOUNTER — VIRTUAL VISIT (OUTPATIENT)
Dept: FAMILY MEDICINE | Facility: CLINIC | Age: 34
End: 2024-01-23
Payer: COMMERCIAL

## 2024-01-23 ENCOUNTER — MYC MEDICAL ADVICE (OUTPATIENT)
Dept: FAMILY MEDICINE | Facility: CLINIC | Age: 34
End: 2024-01-23

## 2024-01-23 DIAGNOSIS — F41.1 GAD (GENERALIZED ANXIETY DISORDER): ICD-10-CM

## 2024-01-23 DIAGNOSIS — F33.1 MODERATE EPISODE OF RECURRENT MAJOR DEPRESSIVE DISORDER (H): ICD-10-CM

## 2024-01-23 PROCEDURE — 99214 OFFICE O/P EST MOD 30 MIN: CPT | Mod: 95 | Performed by: FAMILY MEDICINE

## 2024-01-23 RX ORDER — BUPROPION HYDROCHLORIDE 300 MG/1
300 TABLET ORAL EVERY MORNING
Qty: 90 TABLET | Refills: 3 | Status: SHIPPED | OUTPATIENT
Start: 2024-01-23

## 2024-01-23 RX ORDER — CITALOPRAM HYDROBROMIDE 20 MG/1
20 TABLET ORAL EVERY MORNING
Qty: 90 TABLET | Refills: 3 | Status: SHIPPED | OUTPATIENT
Start: 2024-01-23

## 2024-01-23 RX ORDER — BUPROPION HYDROCHLORIDE 300 MG/1
300 TABLET ORAL EVERY MORNING
Qty: 90 TABLET | Refills: 3 | OUTPATIENT
Start: 2024-01-23

## 2024-01-23 NOTE — PROGRESS NOTES
José is a 33 year old who is being evaluated via a billable video visit.      How would you like to obtain your AVS? MyChart  If the video visit is dropped, the invitation should be resent by: Text to cell phone: [phone]  Will anyone else be joining your video visit? No          Assessment & Plan     Moderate episode of recurrent major depressive disorder (H)  Will submit phq and jose score. Patient is tolerating current medication without any major side effects of concerns and current dose seems reasonable too.  Current medication regime is effective. Continue current treatment without any changes.   - buPROPion (WELLBUTRIN XL) 300 MG 24 hr tablet; Take 1 tablet (300 mg) by mouth every morning  - citalopram (CELEXA) 20 MG tablet; Take 1 tablet (20 mg) by mouth every morning    JOSE (generalized anxiety disorder)  Patient is tolerating current medication without any major side effects of concerns and current dose seems reasonable too.  Current medication regime is effective. Continue current treatment without any changes.   - buPROPion (WELLBUTRIN XL) 300 MG 24 hr tablet; Take 1 tablet (300 mg) by mouth every morning  - citalopram (CELEXA) 20 MG tablet; Take 1 tablet (20 mg) by mouth every morning                Subjective   José is a 33 year old, presenting for the following health issues:  Refill Request        1/23/2024     4:34 PM   Additional Questions   Roomed by Lorena STEVENSON     Energy is lower in winter months. Has SAD light, vitamin D supplement.     No issues with medications.     Therapist - currently seeing them once per month.         11/2/2021    12:01 PM 1/5/2023     2:45 AM 1/10/2023     9:04 AM   PHQ   PHQ-9 Total Score 2 1 3   Q9: Thoughts of better off dead/self-harm past 2 weeks Not at all Not at all Not at all           9/25/2020    10:24 AM 1/5/2023     2:45 AM 1/10/2023     9:04 AM   JOSE-7 SCORE   Total Score  1 (minimal anxiety)    Total Score 4 1 3           Objective    Vitals  - Patient Reported  Weight (Patient Reported): 77.1 kg (170 lb)  Height (Patient Reported): 182.9 cm (6')  BMI (Based on Pt Reported Ht/Wt): 23.06  Pain Score: No Pain (0)        Physical Exam   GENERAL: alert and no distress  EYES: Eyes grossly normal to inspection.  No discharge or erythema, or obvious scleral/conjunctival abnormalities.  RESP: No audible wheeze, cough, or visible cyanosis.    SKIN: Visible skin clear. No significant rash, abnormal pigmentation or lesions.  NEURO: Cranial nerves grossly intact.  Mentation and speech appropriate for age.  PSYCH: Appropriate affect, tone, and pace of words          Video-Visit Details    Type of service:  Video Visit   Video Start Time: 4:54 PM  Video End Time:5:07 PM    Originating Location (pt. Location): Home    Distant Location (provider location):  On-site  Platform used for Video Visit: Aimee  Signed Electronically by: Levy Madrid MD, MD

## 2024-07-14 ENCOUNTER — HEALTH MAINTENANCE LETTER (OUTPATIENT)
Age: 34
End: 2024-07-14

## 2024-11-03 DIAGNOSIS — F33.1 MODERATE EPISODE OF RECURRENT MAJOR DEPRESSIVE DISORDER (H): ICD-10-CM

## 2024-11-03 DIAGNOSIS — F41.1 GAD (GENERALIZED ANXIETY DISORDER): ICD-10-CM

## 2024-11-03 RX ORDER — BUPROPION HYDROCHLORIDE 300 MG/1
300 TABLET ORAL EVERY MORNING
Qty: 90 TABLET | Refills: 3 | OUTPATIENT
Start: 2024-11-03

## 2024-11-07 ASSESSMENT — ANXIETY QUESTIONNAIRES: GAD7 TOTAL SCORE: 1

## 2024-11-07 ASSESSMENT — PATIENT HEALTH QUESTIONNAIRE - PHQ9: SUM OF ALL RESPONSES TO PHQ QUESTIONS 1-9: 3
